# Patient Record
Sex: FEMALE | NOT HISPANIC OR LATINO | RURAL
[De-identification: names, ages, dates, MRNs, and addresses within clinical notes are randomized per-mention and may not be internally consistent; named-entity substitution may affect disease eponyms.]

---

## 2017-08-16 ENCOUNTER — OFFICE (OUTPATIENT)
Dept: RURAL CLINIC 3 | Facility: CLINIC | Age: 62
End: 2017-08-16

## 2017-08-16 VITALS
HEIGHT: 66 IN | DIASTOLIC BLOOD PRESSURE: 62 MMHG | HEART RATE: 88 BPM | SYSTOLIC BLOOD PRESSURE: 123 MMHG | WEIGHT: 254 LBS

## 2017-08-16 DIAGNOSIS — K59.1 FUNCTIONAL DIARRHEA: ICD-10-CM

## 2017-08-16 DIAGNOSIS — K21.9 GASTRO-ESOPHAGEAL REFLUX DISEASE WITHOUT ESOPHAGITIS: ICD-10-CM

## 2017-08-16 PROCEDURE — 99203 OFFICE O/P NEW LOW 30 MIN: CPT | Performed by: INTERNAL MEDICINE

## 2017-08-16 RX ORDER — RANITIDINE 300 MG/1
300 TABLET ORAL
Qty: 30 | Refills: 11 | Status: ACTIVE
Start: 2017-08-16

## 2017-10-18 ENCOUNTER — ON CAMPUS - OUTPATIENT (OUTPATIENT)
Dept: RURAL HOSPITAL 3 | Facility: HOSPITAL | Age: 62
End: 2017-10-18
Payer: COMMERCIAL

## 2017-10-18 DIAGNOSIS — R19.7 DIARRHEA, UNSPECIFIED: ICD-10-CM

## 2017-10-18 DIAGNOSIS — K57.90 DIVERTICULOSIS OF INTESTINE, PART UNSPECIFIED, WITHOUT PERFO: ICD-10-CM

## 2017-10-18 PROCEDURE — 45378 DIAGNOSTIC COLONOSCOPY: CPT | Performed by: INTERNAL MEDICINE

## 2017-11-20 ENCOUNTER — HOSPITAL ENCOUNTER (OUTPATIENT)
Dept: PHYSICAL THERAPY | Facility: HOSPITAL | Age: 62
Setting detail: RECURRING SERIES
Discharge: HOME | End: 2018-01-27
Attending: FAMILY MEDICINE | Admitting: FAMILY MEDICINE

## 2018-05-23 ENCOUNTER — HOSPITAL ENCOUNTER (OUTPATIENT)
Dept: PHYSICAL THERAPY | Facility: HOSPITAL | Age: 63
Setting detail: RECURRING SERIES
Discharge: HOME | End: 2018-07-23
Attending: FAMILY MEDICINE | Admitting: FAMILY MEDICINE

## 2020-03-12 ENCOUNTER — TRANSCRIBE ORDERS (OUTPATIENT)
Dept: ADMINISTRATIVE | Facility: HOSPITAL | Age: 65
End: 2020-03-12

## 2020-03-12 DIAGNOSIS — Z00.00 WELLNESS EXAMINATION: ICD-10-CM

## 2020-03-12 DIAGNOSIS — Z13.6 ENCOUNTER FOR SCREENING FOR VASCULAR DISEASE: Primary | ICD-10-CM

## 2020-07-08 ENCOUNTER — HOSPITAL ENCOUNTER (OUTPATIENT)
Dept: CARDIOLOGY | Facility: HOSPITAL | Age: 65
Discharge: HOME | End: 2020-07-08
Admitting: FAMILY MEDICINE

## 2020-07-08 ENCOUNTER — HOSPITAL ENCOUNTER (OUTPATIENT)
Dept: CT IMAGING | Facility: HOSPITAL | Age: 65
Discharge: HOME | End: 2020-07-08

## 2020-07-08 DIAGNOSIS — Z13.6 ENCOUNTER FOR SCREENING FOR VASCULAR DISEASE: ICD-10-CM

## 2020-07-08 DIAGNOSIS — Z00.00 WELLNESS EXAMINATION: ICD-10-CM

## 2020-07-08 LAB
BH CV XLRA MEAS - PAD LEFT ABI PT: 1.2
BH CV XLRA MEAS - PAD LEFT ARM: 127 MMHG
BH CV XLRA MEAS - PAD LEFT LEG PT: 152 MMHG
BH CV XLRA MEAS - PAD RIGHT ABI PT: 1.17
BH CV XLRA MEAS - PAD RIGHT ARM: 125 MMHG
BH CV XLRA MEAS - PAD RIGHT LEG PT: 148 MMHG
BH CV XLRA MEAS - PROX AO DIAM: 1.43 CM
BH CV XLRA MEAS LEFT CCA RATIO VEL: 80.1 CM/SEC
BH CV XLRA MEAS LEFT ICA RATIO VEL: -76.7 CM/SEC
BH CV XLRA MEAS LEFT ICA/CCA RATIO: -0.96
BH CV XLRA MEAS RIGHT CCA RATIO VEL: 87 CM/SEC
BH CV XLRA MEAS RIGHT ICA RATIO VEL: -82.1 CM/SEC
BH CV XLRA MEAS RIGHT ICA/CCA RATIO: -0.94

## 2020-07-08 PROCEDURE — 93799 UNLISTED CV SVC/PROCEDURE: CPT

## 2020-07-08 PROCEDURE — 75571 CT HRT W/O DYE W/CA TEST: CPT

## 2021-06-21 ENCOUNTER — HOSPITAL ENCOUNTER (OUTPATIENT)
Dept: GENERAL RADIOLOGY | Facility: HOSPITAL | Age: 66
Discharge: HOME | End: 2021-06-21

## 2021-06-21 ENCOUNTER — LAB (OUTPATIENT)
Dept: LAB | Facility: HOSPITAL | Age: 66
End: 2021-06-21

## 2021-06-21 ENCOUNTER — HOSPITAL ENCOUNTER (OUTPATIENT)
Dept: CARDIOLOGY | Facility: HOSPITAL | Age: 66
Discharge: HOME | End: 2021-06-21

## 2021-06-21 LAB
ABO GROUP BLD: NORMAL
ANION GAP SERPL CALCULATED.3IONS-SCNC: 7.4 MMOL/L (ref 5–15)
APTT PPP: 27.6 SECONDS (ref 24–31)
BACTERIA UR QL AUTO: NORMAL /HPF
BILIRUB UR QL STRIP: NEGATIVE
BLD GP AB SCN SERPL QL: NEGATIVE
BUN SERPL-MCNC: 16 MG/DL (ref 8–23)
BUN/CREAT SERPL: 66.7 (ref 7–25)
CALCIUM SPEC-SCNC: 9 MG/DL (ref 8.6–10.5)
CHLORIDE SERPL-SCNC: 105 MMOL/L (ref 98–107)
CLARITY UR: CLEAR
CO2 SERPL-SCNC: 26.6 MMOL/L (ref 22–29)
COLOR UR: YELLOW
CREAT SERPL-MCNC: 0.24 MG/DL (ref 0.57–1)
DEPRECATED RDW RBC AUTO: 41.5 FL (ref 37–54)
ERYTHROCYTE [DISTWIDTH] IN BLOOD BY AUTOMATED COUNT: 12.6 % (ref 12.3–15.4)
GFR SERPL CREATININE-BSD FRML MDRD: >150 ML/MIN/1.73
GLUCOSE SERPL-MCNC: 78 MG/DL (ref 65–99)
GLUCOSE UR STRIP-MCNC: NEGATIVE MG/DL
HCT VFR BLD AUTO: 41.5 % (ref 34–46.6)
HGB BLD-MCNC: 14.1 G/DL (ref 12–15.9)
HGB UR QL STRIP.AUTO: NEGATIVE
HYALINE CASTS UR QL AUTO: NORMAL /LPF
INR PPP: 0.98 (ref 0.93–1.1)
KETONES UR QL STRIP: NEGATIVE
LEUKOCYTE ESTERASE UR QL STRIP.AUTO: ABNORMAL
MCH RBC QN AUTO: 30.4 PG (ref 26.6–33)
MCHC RBC AUTO-ENTMCNC: 34 G/DL (ref 31.5–35.7)
MCV RBC AUTO: 89.4 FL (ref 79–97)
MRSA DNA SPEC QL NAA+PROBE: NORMAL
NITRITE UR QL STRIP: NEGATIVE
PH UR STRIP.AUTO: 6.5 [PH] (ref 5–8)
PLATELET # BLD AUTO: 251 10*3/MM3 (ref 140–450)
PMV BLD AUTO: 10.2 FL (ref 6–12)
POTASSIUM SERPL-SCNC: 4 MMOL/L (ref 3.5–5.2)
PROT UR QL STRIP: NEGATIVE
PROTHROMBIN TIME: 10.9 SECONDS (ref 9.6–11.7)
RBC # BLD AUTO: 4.64 10*6/MM3 (ref 3.77–5.28)
RBC # UR: NORMAL /HPF
REF LAB TEST METHOD: NORMAL
RH BLD: POSITIVE
SODIUM SERPL-SCNC: 139 MMOL/L (ref 136–145)
SP GR UR STRIP: 1.01 (ref 1–1.03)
SQUAMOUS #/AREA URNS HPF: NORMAL /HPF
T&S EXPIRATION DATE: NORMAL
UROBILINOGEN UR QL STRIP: ABNORMAL
WBC # BLD AUTO: 6.24 10*3/MM3 (ref 3.4–10.8)
WBC UR QL AUTO: NORMAL /HPF

## 2021-06-21 PROCEDURE — 93010 ELECTROCARDIOGRAM REPORT: CPT | Performed by: INTERNAL MEDICINE

## 2021-06-21 PROCEDURE — 86900 BLOOD TYPING SEROLOGIC ABO: CPT

## 2021-06-21 PROCEDURE — 86850 RBC ANTIBODY SCREEN: CPT

## 2021-06-21 PROCEDURE — 86901 BLOOD TYPING SEROLOGIC RH(D): CPT

## 2021-06-21 PROCEDURE — 93005 ELECTROCARDIOGRAM TRACING: CPT | Performed by: ORTHOPAEDIC SURGERY

## 2021-06-21 PROCEDURE — 71046 X-RAY EXAM CHEST 2 VIEWS: CPT

## 2021-06-21 PROCEDURE — 85027 COMPLETE CBC AUTOMATED: CPT

## 2021-06-21 PROCEDURE — 85730 THROMBOPLASTIN TIME PARTIAL: CPT

## 2021-06-21 PROCEDURE — 85610 PROTHROMBIN TIME: CPT

## 2021-06-21 PROCEDURE — 80048 BASIC METABOLIC PNL TOTAL CA: CPT

## 2021-06-21 PROCEDURE — 87641 MR-STAPH DNA AMP PROBE: CPT

## 2021-06-21 PROCEDURE — 81001 URINALYSIS AUTO W/SCOPE: CPT

## 2021-06-23 ENCOUNTER — LAB (OUTPATIENT)
Dept: LAB | Facility: HOSPITAL | Age: 66
End: 2021-06-23

## 2021-06-23 LAB — SARS-COV-2 ORF1AB RESP QL NAA+PROBE: NOT DETECTED

## 2021-06-23 PROCEDURE — U0004 COV-19 TEST NON-CDC HGH THRU: HCPCS

## 2021-06-23 PROCEDURE — C9803 HOPD COVID-19 SPEC COLLECT: HCPCS

## 2021-06-25 LAB — QT INTERVAL: 422 MS

## 2021-12-02 ASSESSMENT — HOOS JR
HOOS JR SCORE: 52.965
HOOS JR SCORE: 12

## 2021-12-06 ENCOUNTER — LAB (OUTPATIENT)
Dept: LAB | Facility: HOSPITAL | Age: 66
End: 2021-12-06

## 2021-12-06 ENCOUNTER — HOSPITAL ENCOUNTER (OUTPATIENT)
Dept: GENERAL RADIOLOGY | Facility: HOSPITAL | Age: 66
Discharge: HOME | End: 2021-12-06

## 2021-12-06 ENCOUNTER — HOSPITAL ENCOUNTER (OUTPATIENT)
Dept: CARDIOLOGY | Facility: HOSPITAL | Age: 66
Discharge: HOME | End: 2021-12-06

## 2021-12-06 LAB
ANION GAP SERPL CALCULATED.3IONS-SCNC: 6.2 MMOL/L (ref 5–15)
APTT PPP: 28.4 SECONDS (ref 24–31)
BACTERIA UR QL AUTO: ABNORMAL /HPF
BASOPHILS # BLD AUTO: 0.04 10*3/MM3 (ref 0–0.2)
BASOPHILS NFR BLD AUTO: 0.6 % (ref 0–1.5)
BILIRUB UR QL STRIP: NEGATIVE
BUN SERPL-MCNC: 17 MG/DL (ref 8–23)
BUN/CREAT SERPL: 25.8 (ref 7–25)
CALCIUM SPEC-SCNC: 9.1 MG/DL (ref 8.6–10.5)
CHLORIDE SERPL-SCNC: 102 MMOL/L (ref 98–107)
CLARITY UR: CLEAR
CO2 SERPL-SCNC: 28.8 MMOL/L (ref 22–29)
COLOR UR: YELLOW
CREAT SERPL-MCNC: 0.66 MG/DL (ref 0.57–1)
DEPRECATED RDW RBC AUTO: 43.3 FL (ref 37–54)
EOSINOPHIL # BLD AUTO: 0.22 10*3/MM3 (ref 0–0.4)
EOSINOPHIL NFR BLD AUTO: 3.1 % (ref 0.3–6.2)
ERYTHROCYTE [DISTWIDTH] IN BLOOD BY AUTOMATED COUNT: 12.9 % (ref 12.3–15.4)
GFR SERPL CREATININE-BSD FRML MDRD: 90 ML/MIN/1.73
GLUCOSE SERPL-MCNC: 79 MG/DL (ref 65–99)
GLUCOSE UR STRIP-MCNC: NEGATIVE MG/DL
HCT VFR BLD AUTO: 40.7 % (ref 34–46.6)
HGB BLD-MCNC: 13.1 G/DL (ref 12–15.9)
HGB UR QL STRIP.AUTO: NEGATIVE
HYALINE CASTS UR QL AUTO: ABNORMAL /LPF
IMM GRANULOCYTES # BLD AUTO: 0.01 10*3/MM3 (ref 0–0.05)
IMM GRANULOCYTES NFR BLD AUTO: 0.1 % (ref 0–0.5)
INR PPP: 0.96 (ref 0.93–1.1)
KETONES UR QL STRIP: NEGATIVE
LEUKOCYTE ESTERASE UR QL STRIP.AUTO: ABNORMAL
LYMPHOCYTES # BLD AUTO: 2.64 10*3/MM3 (ref 0.7–3.1)
LYMPHOCYTES NFR BLD AUTO: 37.3 % (ref 19.6–45.3)
MCH RBC QN AUTO: 29.5 PG (ref 26.6–33)
MCHC RBC AUTO-ENTMCNC: 32.2 G/DL (ref 31.5–35.7)
MCV RBC AUTO: 91.7 FL (ref 79–97)
MONOCYTES # BLD AUTO: 0.58 10*3/MM3 (ref 0.1–0.9)
MONOCYTES NFR BLD AUTO: 8.2 % (ref 5–12)
MRSA DNA SPEC QL NAA+PROBE: NORMAL
NEUTROPHILS NFR BLD AUTO: 3.58 10*3/MM3 (ref 1.7–7)
NEUTROPHILS NFR BLD AUTO: 50.7 % (ref 42.7–76)
NITRITE UR QL STRIP: NEGATIVE
NRBC BLD AUTO-RTO: 0.1 /100 WBC (ref 0–0.2)
PH UR STRIP.AUTO: 6.5 [PH] (ref 5–8)
PLATELET # BLD AUTO: 238 10*3/MM3 (ref 140–450)
PMV BLD AUTO: 10.5 FL (ref 6–12)
POTASSIUM SERPL-SCNC: 3.9 MMOL/L (ref 3.5–5.2)
PROT UR QL STRIP: NEGATIVE
PROTHROMBIN TIME: 10.7 SECONDS (ref 9.6–11.7)
QT INTERVAL: 440 MS
RBC # BLD AUTO: 4.44 10*6/MM3 (ref 3.77–5.28)
RBC # UR STRIP: ABNORMAL /HPF
REF LAB TEST METHOD: ABNORMAL
SODIUM SERPL-SCNC: 137 MMOL/L (ref 136–145)
SP GR UR STRIP: 1.01 (ref 1–1.03)
SQUAMOUS #/AREA URNS HPF: ABNORMAL /HPF
UROBILINOGEN UR QL STRIP: ABNORMAL
WBC # UR STRIP: ABNORMAL /HPF
WBC NRBC COR # BLD: 7.07 10*3/MM3 (ref 3.4–10.8)

## 2021-12-06 PROCEDURE — 81001 URINALYSIS AUTO W/SCOPE: CPT

## 2021-12-06 PROCEDURE — 85730 THROMBOPLASTIN TIME PARTIAL: CPT

## 2021-12-06 PROCEDURE — 85610 PROTHROMBIN TIME: CPT

## 2021-12-06 PROCEDURE — 93010 ELECTROCARDIOGRAM REPORT: CPT | Performed by: INTERNAL MEDICINE

## 2021-12-06 PROCEDURE — 85025 COMPLETE CBC W/AUTO DIFF WBC: CPT

## 2021-12-06 PROCEDURE — 87641 MR-STAPH DNA AMP PROBE: CPT

## 2021-12-06 PROCEDURE — 93005 ELECTROCARDIOGRAM TRACING: CPT | Performed by: ORTHOPAEDIC SURGERY

## 2021-12-06 PROCEDURE — 80048 BASIC METABOLIC PNL TOTAL CA: CPT

## 2021-12-06 PROCEDURE — 71046 X-RAY EXAM CHEST 2 VIEWS: CPT

## 2021-12-08 ENCOUNTER — LAB (OUTPATIENT)
Dept: LAB | Facility: HOSPITAL | Age: 66
End: 2021-12-08

## 2021-12-08 LAB — SARS-COV-2 ORF1AB RESP QL NAA+PROBE: NOT DETECTED

## 2021-12-08 PROCEDURE — C9803 HOPD COVID-19 SPEC COLLECT: HCPCS

## 2021-12-08 PROCEDURE — U0004 COV-19 TEST NON-CDC HGH THRU: HCPCS

## 2021-12-10 ENCOUNTER — APPOINTMENT (OUTPATIENT)
Dept: GENERAL RADIOLOGY | Facility: HOSPITAL | Age: 66
End: 2021-12-10

## 2021-12-10 ENCOUNTER — HOSPITAL ENCOUNTER (OUTPATIENT)
Facility: HOSPITAL | Age: 66
Setting detail: OBSERVATION
Discharge: HOME | End: 2021-12-11
Attending: ORTHOPAEDIC SURGERY | Admitting: ORTHOPAEDIC SURGERY

## 2021-12-10 ENCOUNTER — ANESTHESIA EVENT (OUTPATIENT)
Dept: PERIOP | Facility: HOSPITAL | Age: 66
End: 2021-12-10

## 2021-12-10 ENCOUNTER — ANESTHESIA (OUTPATIENT)
Dept: PERIOP | Facility: HOSPITAL | Age: 66
End: 2021-12-10

## 2021-12-10 DIAGNOSIS — Z96.642 STATUS POST TOTAL REPLACEMENT OF LEFT HIP: Primary | ICD-10-CM

## 2021-12-10 PROBLEM — Z96.649 STATUS POST TOTAL REPLACEMENT OF HIP: Status: ACTIVE | Noted: 2021-12-10

## 2021-12-10 LAB
ABO GROUP BLD: NORMAL
BLD GP AB SCN SERPL QL: NEGATIVE
HCT VFR BLD AUTO: 39.6 % (ref 34–46.6)
HGB BLD-MCNC: 13.4 G/DL (ref 12–15.9)
RH BLD: POSITIVE
T&S EXPIRATION DATE: NORMAL

## 2021-12-10 PROCEDURE — 25010000002 ONDANSETRON PER 1 MG: Performed by: NURSE ANESTHETIST, CERTIFIED REGISTERED

## 2021-12-10 PROCEDURE — 25010000002 CEFAZOLIN PER 500 MG: Performed by: ORTHOPAEDIC SURGERY

## 2021-12-10 PROCEDURE — G0378 HOSPITAL OBSERVATION PER HR: HCPCS

## 2021-12-10 PROCEDURE — 86850 RBC ANTIBODY SCREEN: CPT | Performed by: ORTHOPAEDIC SURGERY

## 2021-12-10 PROCEDURE — C1889 IMPLANT/INSERT DEVICE, NOC: HCPCS | Performed by: ORTHOPAEDIC SURGERY

## 2021-12-10 PROCEDURE — 72170 X-RAY EXAM OF PELVIS: CPT

## 2021-12-10 PROCEDURE — 73501 X-RAY EXAM HIP UNI 1 VIEW: CPT

## 2021-12-10 PROCEDURE — 86901 BLOOD TYPING SEROLOGIC RH(D): CPT | Performed by: ORTHOPAEDIC SURGERY

## 2021-12-10 PROCEDURE — 25010000002 PROPOFOL 10 MG/ML EMULSION: Performed by: NURSE ANESTHETIST, CERTIFIED REGISTERED

## 2021-12-10 PROCEDURE — 25010000002 DEXAMETHASONE PER 1 MG: Performed by: NURSE ANESTHETIST, CERTIFIED REGISTERED

## 2021-12-10 PROCEDURE — 25010000002 PROPOFOL 200 MG/20ML EMULSION: Performed by: NURSE ANESTHETIST, CERTIFIED REGISTERED

## 2021-12-10 PROCEDURE — C1776 JOINT DEVICE (IMPLANTABLE): HCPCS | Performed by: ORTHOPAEDIC SURGERY

## 2021-12-10 PROCEDURE — 25010000002 MAGNESIUM SULFATE PER 500 MG OF MAGNESIUM: Performed by: NURSE ANESTHETIST, CERTIFIED REGISTERED

## 2021-12-10 PROCEDURE — 25010000002 FENTANYL CITRATE (PF) 100 MCG/2ML SOLUTION: Performed by: NURSE ANESTHETIST, CERTIFIED REGISTERED

## 2021-12-10 PROCEDURE — 85014 HEMATOCRIT: CPT | Performed by: ORTHOPAEDIC SURGERY

## 2021-12-10 PROCEDURE — 25010000002 HYDROMORPHONE PER 4 MG: Performed by: NURSE ANESTHETIST, CERTIFIED REGISTERED

## 2021-12-10 PROCEDURE — 25010000002 MIDAZOLAM PER 1 MG: Performed by: NURSE ANESTHETIST, CERTIFIED REGISTERED

## 2021-12-10 PROCEDURE — 76000 FLUOROSCOPY <1 HR PHYS/QHP: CPT

## 2021-12-10 PROCEDURE — 25010000002 DROPERIDOL PER 5 MG: Performed by: NURSE ANESTHETIST, CERTIFIED REGISTERED

## 2021-12-10 PROCEDURE — 85018 HEMOGLOBIN: CPT | Performed by: ORTHOPAEDIC SURGERY

## 2021-12-10 PROCEDURE — 86900 BLOOD TYPING SEROLOGIC ABO: CPT | Performed by: ORTHOPAEDIC SURGERY

## 2021-12-10 PROCEDURE — 25010000002 HYDROMORPHONE PER 4 MG: Performed by: ORTHOPAEDIC SURGERY

## 2021-12-10 DEVICE — OR3O DUAL MOBILITY XLPE INSERT 22/40
Type: IMPLANTABLE DEVICE | Site: HIP | Status: FUNCTIONAL
Brand: OR3O DUAL MOBILITY

## 2021-12-10 DEVICE — OR3O DUAL MOBILITY LINER 40/52
Type: IMPLANTABLE DEVICE | Site: HIP | Status: FUNCTIONAL
Brand: OR3O DUAL MOBILITY

## 2021-12-10 DEVICE — IMPLANTABLE DEVICE: Type: IMPLANTABLE DEVICE | Site: HIP | Status: FUNCTIONAL

## 2021-12-10 DEVICE — POLARSTEM STANDARD NON-CEMENTED                                    WITH TI/HA 2
Type: IMPLANTABLE DEVICE | Site: HIP | Status: FUNCTIONAL
Brand: POLARSTEM

## 2021-12-10 DEVICE — OXINIUM FEMORAL HEAD 12/14 TAPER                                    22 MM +4
Type: IMPLANTABLE DEVICE | Site: HIP | Status: FUNCTIONAL
Brand: OXINIUM

## 2021-12-10 DEVICE — R3 3 HOLE ACETABULAR SHELL 52MM
Type: IMPLANTABLE DEVICE | Site: HIP | Status: FUNCTIONAL
Brand: R3 ACETABULAR

## 2021-12-10 DEVICE — DEV CONTRL TISS STRATAFIX SPIRAL PDS PLS CT1 2-0 1/2 30CM: Type: IMPLANTABLE DEVICE | Site: HIP | Status: FUNCTIONAL

## 2021-12-10 RX ORDER — PROPOFOL 10 MG/ML
INJECTION, EMULSION INTRAVENOUS AS NEEDED
Status: DISCONTINUED | OUTPATIENT
Start: 2021-12-10 | End: 2021-12-10 | Stop reason: SURG

## 2021-12-10 RX ORDER — ONDANSETRON 4 MG/1
4 TABLET, FILM COATED ORAL EVERY 6 HOURS PRN
Status: DISCONTINUED | OUTPATIENT
Start: 2021-12-10 | End: 2021-12-11 | Stop reason: HOSPADM

## 2021-12-10 RX ORDER — ONDANSETRON 2 MG/ML
INJECTION INTRAMUSCULAR; INTRAVENOUS AS NEEDED
Status: DISCONTINUED | OUTPATIENT
Start: 2021-12-10 | End: 2021-12-10 | Stop reason: SURG

## 2021-12-10 RX ORDER — OXYCODONE HYDROCHLORIDE 5 MG/1
10 TABLET ORAL EVERY 4 HOURS PRN
Status: DISCONTINUED | OUTPATIENT
Start: 2021-12-10 | End: 2021-12-11 | Stop reason: HOSPADM

## 2021-12-10 RX ORDER — MAGNESIUM SULFATE HEPTAHYDRATE 500 MG/ML
INJECTION, SOLUTION INTRAMUSCULAR; INTRAVENOUS AS NEEDED
Status: DISCONTINUED | OUTPATIENT
Start: 2021-12-10 | End: 2021-12-10 | Stop reason: SURG

## 2021-12-10 RX ORDER — LIDOCAINE HYDROCHLORIDE 20 MG/ML
INJECTION, SOLUTION EPIDURAL; INFILTRATION; INTRACAUDAL; PERINEURAL AS NEEDED
Status: DISCONTINUED | OUTPATIENT
Start: 2021-12-10 | End: 2021-12-10 | Stop reason: SURG

## 2021-12-10 RX ORDER — OXYCODONE HYDROCHLORIDE 5 MG/1
5 TABLET ORAL EVERY 4 HOURS PRN
Status: DISCONTINUED | OUTPATIENT
Start: 2021-12-10 | End: 2021-12-11 | Stop reason: HOSPADM

## 2021-12-10 RX ORDER — DROPERIDOL 2.5 MG/ML
1.25 INJECTION, SOLUTION INTRAMUSCULAR; INTRAVENOUS ONCE AS NEEDED
Status: COMPLETED | OUTPATIENT
Start: 2021-12-10 | End: 2021-12-10

## 2021-12-10 RX ORDER — HYDROMORPHONE HCL 110MG/55ML
0.5 PATIENT CONTROLLED ANALGESIA SYRINGE INTRAVENOUS
Status: DISCONTINUED | OUTPATIENT
Start: 2021-12-10 | End: 2021-12-10 | Stop reason: HOSPADM

## 2021-12-10 RX ORDER — EPHEDRINE SULFATE 5 MG/ML
5 INJECTION INTRAVENOUS ONCE AS NEEDED
Status: DISCONTINUED | OUTPATIENT
Start: 2021-12-10 | End: 2021-12-10 | Stop reason: HOSPADM

## 2021-12-10 RX ORDER — MIDAZOLAM HYDROCHLORIDE 1 MG/ML
INJECTION INTRAMUSCULAR; INTRAVENOUS AS NEEDED
Status: DISCONTINUED | OUTPATIENT
Start: 2021-12-10 | End: 2021-12-10 | Stop reason: SURG

## 2021-12-10 RX ORDER — BUPIVACAINE HYDROCHLORIDE 2.5 MG/ML
INJECTION, SOLUTION EPIDURAL; INFILTRATION; INTRACAUDAL AS NEEDED
Status: DISCONTINUED | OUTPATIENT
Start: 2021-12-10 | End: 2021-12-10 | Stop reason: HOSPADM

## 2021-12-10 RX ORDER — ASPIRIN 81 MG/1
81 TABLET ORAL EVERY 12 HOURS SCHEDULED
Status: DISCONTINUED | OUTPATIENT
Start: 2021-12-11 | End: 2021-12-11 | Stop reason: HOSPADM

## 2021-12-10 RX ORDER — FENTANYL CITRATE 50 UG/ML
50 INJECTION, SOLUTION INTRAMUSCULAR; INTRAVENOUS
Status: DISCONTINUED | OUTPATIENT
Start: 2021-12-10 | End: 2021-12-10 | Stop reason: HOSPADM

## 2021-12-10 RX ORDER — HYDROMORPHONE HCL 110MG/55ML
1 PATIENT CONTROLLED ANALGESIA SYRINGE INTRAVENOUS EVERY 4 HOURS PRN
Status: DISCONTINUED | OUTPATIENT
Start: 2021-12-10 | End: 2021-12-11 | Stop reason: HOSPADM

## 2021-12-10 RX ORDER — IRBESARTAN 75 MG/1
TABLET ORAL
Status: ON HOLD | COMMUNITY
End: 2021-12-10 | Stop reason: SDUPTHER

## 2021-12-10 RX ORDER — SODIUM CHLORIDE, SODIUM LACTATE, POTASSIUM CHLORIDE, CALCIUM CHLORIDE 600; 310; 30; 20 MG/100ML; MG/100ML; MG/100ML; MG/100ML
100 INJECTION, SOLUTION INTRAVENOUS CONTINUOUS
Status: DISCONTINUED | OUTPATIENT
Start: 2021-12-10 | End: 2021-12-11 | Stop reason: HOSPADM

## 2021-12-10 RX ORDER — SODIUM CHLORIDE 0.9 % (FLUSH) 0.9 %
10 SYRINGE (ML) INJECTION AS NEEDED
Status: DISCONTINUED | OUTPATIENT
Start: 2021-12-10 | End: 2021-12-10 | Stop reason: HOSPADM

## 2021-12-10 RX ORDER — ONDANSETRON 2 MG/ML
4 INJECTION INTRAMUSCULAR; INTRAVENOUS ONCE AS NEEDED
Status: COMPLETED | OUTPATIENT
Start: 2021-12-10 | End: 2021-12-10

## 2021-12-10 RX ORDER — KETAMINE HCL IN NACL, ISO-OSM 100MG/10ML
SYRINGE (ML) INJECTION AS NEEDED
Status: DISCONTINUED | OUTPATIENT
Start: 2021-12-10 | End: 2021-12-10 | Stop reason: SURG

## 2021-12-10 RX ORDER — FLUMAZENIL 0.1 MG/ML
0.1 INJECTION INTRAVENOUS AS NEEDED
Status: DISCONTINUED | OUTPATIENT
Start: 2021-12-10 | End: 2021-12-10 | Stop reason: HOSPADM

## 2021-12-10 RX ORDER — LORAZEPAM 2 MG/ML
1 INJECTION INTRAMUSCULAR
Status: DISCONTINUED | OUTPATIENT
Start: 2021-12-10 | End: 2021-12-10 | Stop reason: HOSPADM

## 2021-12-10 RX ORDER — DOCUSATE SODIUM 100 MG/1
100 CAPSULE, LIQUID FILLED ORAL 2 TIMES DAILY PRN
Status: DISCONTINUED | OUTPATIENT
Start: 2021-12-10 | End: 2021-12-11 | Stop reason: HOSPADM

## 2021-12-10 RX ORDER — NALOXONE HCL 0.4 MG/ML
0.4 VIAL (ML) INJECTION AS NEEDED
Status: DISCONTINUED | OUTPATIENT
Start: 2021-12-10 | End: 2021-12-10 | Stop reason: HOSPADM

## 2021-12-10 RX ORDER — LABETALOL HYDROCHLORIDE 5 MG/ML
5 INJECTION, SOLUTION INTRAVENOUS
Status: DISCONTINUED | OUTPATIENT
Start: 2021-12-10 | End: 2021-12-10 | Stop reason: HOSPADM

## 2021-12-10 RX ORDER — NALOXONE HCL 0.4 MG/ML
0.1 VIAL (ML) INJECTION
Status: DISCONTINUED | OUTPATIENT
Start: 2021-12-10 | End: 2021-12-11 | Stop reason: HOSPADM

## 2021-12-10 RX ORDER — SODIUM CHLORIDE, SODIUM LACTATE, POTASSIUM CHLORIDE, CALCIUM CHLORIDE 600; 310; 30; 20 MG/100ML; MG/100ML; MG/100ML; MG/100ML
1000 INJECTION, SOLUTION INTRAVENOUS CONTINUOUS
Status: DISCONTINUED | OUTPATIENT
Start: 2021-12-10 | End: 2021-12-11 | Stop reason: HOSPADM

## 2021-12-10 RX ORDER — TRANEXAMIC ACID 10 MG/ML
1000 INJECTION, SOLUTION INTRAVENOUS ONCE
Status: COMPLETED | OUTPATIENT
Start: 2021-12-10 | End: 2021-12-10

## 2021-12-10 RX ORDER — DIPHENHYDRAMINE HYDROCHLORIDE 50 MG/ML
12.5 INJECTION INTRAMUSCULAR; INTRAVENOUS
Status: DISCONTINUED | OUTPATIENT
Start: 2021-12-10 | End: 2021-12-10 | Stop reason: HOSPADM

## 2021-12-10 RX ORDER — FUROSEMIDE 20 MG/1
20 TABLET ORAL DAILY
Status: DISCONTINUED | OUTPATIENT
Start: 2021-12-10 | End: 2021-12-11 | Stop reason: HOSPADM

## 2021-12-10 RX ORDER — FENTANYL CITRATE 50 UG/ML
INJECTION, SOLUTION INTRAMUSCULAR; INTRAVENOUS AS NEEDED
Status: DISCONTINUED | OUTPATIENT
Start: 2021-12-10 | End: 2021-12-10 | Stop reason: SURG

## 2021-12-10 RX ORDER — ESCITALOPRAM OXALATE 10 MG/1
10 TABLET ORAL EVERY MORNING
Status: DISCONTINUED | OUTPATIENT
Start: 2021-12-11 | End: 2021-12-11 | Stop reason: HOSPADM

## 2021-12-10 RX ORDER — DEXAMETHASONE SODIUM PHOSPHATE 4 MG/ML
INJECTION, SOLUTION INTRA-ARTICULAR; INTRALESIONAL; INTRAMUSCULAR; INTRAVENOUS; SOFT TISSUE AS NEEDED
Status: DISCONTINUED | OUTPATIENT
Start: 2021-12-10 | End: 2021-12-10 | Stop reason: SURG

## 2021-12-10 RX ORDER — IPRATROPIUM BROMIDE AND ALBUTEROL SULFATE 2.5; .5 MG/3ML; MG/3ML
3 SOLUTION RESPIRATORY (INHALATION) ONCE AS NEEDED
Status: DISCONTINUED | OUTPATIENT
Start: 2021-12-10 | End: 2021-12-10 | Stop reason: HOSPADM

## 2021-12-10 RX ORDER — SODIUM CHLORIDE, SODIUM LACTATE, POTASSIUM CHLORIDE, CALCIUM CHLORIDE 600; 310; 30; 20 MG/100ML; MG/100ML; MG/100ML; MG/100ML
1000 INJECTION, SOLUTION INTRAVENOUS CONTINUOUS
Status: DISCONTINUED | OUTPATIENT
Start: 2021-12-10 | End: 2021-12-10 | Stop reason: SDUPTHER

## 2021-12-10 RX ORDER — ROCURONIUM BROMIDE 10 MG/ML
INJECTION, SOLUTION INTRAVENOUS AS NEEDED
Status: DISCONTINUED | OUTPATIENT
Start: 2021-12-10 | End: 2021-12-10 | Stop reason: SURG

## 2021-12-10 RX ORDER — TRANEXAMIC ACID 10 MG/ML
1000 INJECTION, SOLUTION INTRAVENOUS ONCE
Status: DISCONTINUED | OUTPATIENT
Start: 2021-12-10 | End: 2021-12-10 | Stop reason: HOSPADM

## 2021-12-10 RX ORDER — MELOXICAM 15 MG/1
15 TABLET ORAL DAILY
Status: DISCONTINUED | OUTPATIENT
Start: 2021-12-10 | End: 2021-12-11 | Stop reason: HOSPADM

## 2021-12-10 RX ORDER — ONDANSETRON 2 MG/ML
4 INJECTION INTRAMUSCULAR; INTRAVENOUS EVERY 6 HOURS PRN
Status: DISCONTINUED | OUTPATIENT
Start: 2021-12-10 | End: 2021-12-11 | Stop reason: HOSPADM

## 2021-12-10 RX ORDER — SODIUM CHLORIDE 9 MG/ML
100 INJECTION, SOLUTION INTRAVENOUS CONTINUOUS
Status: DISCONTINUED | OUTPATIENT
Start: 2021-12-10 | End: 2021-12-11 | Stop reason: HOSPADM

## 2021-12-10 RX ORDER — LIDOCAINE HYDROCHLORIDE 10 MG/ML
INJECTION, SOLUTION EPIDURAL; INFILTRATION; INTRACAUDAL; PERINEURAL AS NEEDED
Status: DISCONTINUED | OUTPATIENT
Start: 2021-12-10 | End: 2021-12-10 | Stop reason: SURG

## 2021-12-10 RX ORDER — HYDRALAZINE HYDROCHLORIDE 20 MG/ML
5 INJECTION INTRAMUSCULAR; INTRAVENOUS
Status: DISCONTINUED | OUTPATIENT
Start: 2021-12-10 | End: 2021-12-10 | Stop reason: HOSPADM

## 2021-12-10 RX ORDER — LOSARTAN POTASSIUM 25 MG/1
25 TABLET ORAL
Status: DISCONTINUED | OUTPATIENT
Start: 2021-12-10 | End: 2021-12-11 | Stop reason: HOSPADM

## 2021-12-10 RX ADMIN — PROPOFOL 150 MG: 10 INJECTION, EMULSION INTRAVENOUS at 14:38

## 2021-12-10 RX ADMIN — HYDROMORPHONE HYDROCHLORIDE 0.5 MG: 2 INJECTION, SOLUTION INTRAMUSCULAR; INTRAVENOUS; SUBCUTANEOUS at 16:30

## 2021-12-10 RX ADMIN — LIDOCAINE HYDROCHLORIDE 200 MG: 20 INJECTION, SOLUTION EPIDURAL; INFILTRATION; INTRACAUDAL; PERINEURAL at 14:36

## 2021-12-10 RX ADMIN — HYDROMORPHONE HYDROCHLORIDE 1 MG: 2 INJECTION, SOLUTION INTRAMUSCULAR; INTRAVENOUS; SUBCUTANEOUS at 22:34

## 2021-12-10 RX ADMIN — FENTANYL CITRATE 25 MCG: 50 INJECTION, SOLUTION INTRAMUSCULAR; INTRAVENOUS at 14:46

## 2021-12-10 RX ADMIN — ONDANSETRON 4 MG: 2 INJECTION INTRAMUSCULAR; INTRAVENOUS at 16:45

## 2021-12-10 RX ADMIN — PROPOFOL 150 MCG/KG/MIN: 10 INJECTION, EMULSION INTRAVENOUS at 14:38

## 2021-12-10 RX ADMIN — SODIUM CHLORIDE, POTASSIUM CHLORIDE, SODIUM LACTATE AND CALCIUM CHLORIDE 1000 ML: 600; 310; 30; 20 INJECTION, SOLUTION INTRAVENOUS at 12:35

## 2021-12-10 RX ADMIN — SUGAMMADEX 200 MG: 100 INJECTION, SOLUTION INTRAVENOUS at 15:36

## 2021-12-10 RX ADMIN — LIDOCAINE HYDROCHLORIDE 50 MG: 10 INJECTION, SOLUTION EPIDURAL; INFILTRATION; INTRACAUDAL; PERINEURAL at 14:38

## 2021-12-10 RX ADMIN — DROPERIDOL 1.25 MG: 2.5 INJECTION, SOLUTION INTRAMUSCULAR; INTRAVENOUS at 17:15

## 2021-12-10 RX ADMIN — WATER 2 G: 1000 INJECTION, SOLUTION INTRAVENOUS at 20:36

## 2021-12-10 RX ADMIN — ROCURONIUM BROMIDE 40 MG: 10 SOLUTION INTRAVENOUS at 14:38

## 2021-12-10 RX ADMIN — MELOXICAM 15 MG: 15 TABLET ORAL at 19:19

## 2021-12-10 RX ADMIN — TRANEXAMIC ACID 1000 MG: 10 INJECTION, SOLUTION INTRAVENOUS at 14:43

## 2021-12-10 RX ADMIN — OXYCODONE 10 MG: 5 TABLET ORAL at 19:19

## 2021-12-10 RX ADMIN — DEXAMETHASONE SODIUM PHOSPHATE 4 MG: 4 INJECTION, SOLUTION INTRAMUSCULAR; INTRAVENOUS at 14:47

## 2021-12-10 RX ADMIN — OXYCODONE 10 MG: 5 TABLET ORAL at 23:18

## 2021-12-10 RX ADMIN — FENTANYL CITRATE 50 MCG: 50 INJECTION, SOLUTION INTRAMUSCULAR; INTRAVENOUS at 15:04

## 2021-12-10 RX ADMIN — ROCURONIUM BROMIDE 20 MG: 10 SOLUTION INTRAVENOUS at 15:18

## 2021-12-10 RX ADMIN — HYDROMORPHONE HYDROCHLORIDE 0.5 MG: 2 INJECTION, SOLUTION INTRAMUSCULAR; INTRAVENOUS; SUBCUTANEOUS at 16:05

## 2021-12-10 RX ADMIN — CEFAZOLIN SODIUM 2 G: 1 INJECTION, POWDER, FOR SOLUTION INTRAMUSCULAR; INTRAVENOUS at 14:44

## 2021-12-10 RX ADMIN — SODIUM CHLORIDE 100 ML/HR: 9 INJECTION, SOLUTION INTRAVENOUS at 20:36

## 2021-12-10 RX ADMIN — FENTANYL CITRATE 25 MCG: 50 INJECTION, SOLUTION INTRAMUSCULAR; INTRAVENOUS at 14:51

## 2021-12-10 RX ADMIN — FUROSEMIDE 20 MG: 20 TABLET ORAL at 20:34

## 2021-12-10 RX ADMIN — Medication 30 MG: at 14:36

## 2021-12-10 RX ADMIN — MIDAZOLAM 2 MG: 1 INJECTION INTRAMUSCULAR; INTRAVENOUS at 14:34

## 2021-12-10 RX ADMIN — LOSARTAN POTASSIUM 25 MG: 25 TABLET, FILM COATED ORAL at 20:34

## 2021-12-10 RX ADMIN — ONDANSETRON 4 MG: 2 INJECTION INTRAMUSCULAR; INTRAVENOUS at 15:26

## 2021-12-10 RX ADMIN — MAGNESIUM SULFATE HEPTAHYDRATE 1 G: 500 INJECTION, SOLUTION INTRAMUSCULAR; INTRAVENOUS at 14:36

## 2021-12-10 RX ADMIN — ONDANSETRON 4 MG: 2 INJECTION INTRAMUSCULAR; INTRAVENOUS at 15:28

## 2021-12-10 RX ADMIN — SODIUM CHLORIDE, POTASSIUM CHLORIDE, SODIUM LACTATE AND CALCIUM CHLORIDE: 600; 310; 30; 20 INJECTION, SOLUTION INTRAVENOUS at 15:31

## 2021-12-10 NOTE — SIGNIFICANT NOTE
12/10/21 1531   OTHER   Discipline physical therapist   Rehab Time/Intention   Session Not Performed patient unavailable for evaluation  (pt still in sx at 15:32. Will eval in AM.)   Recommendation   PT - Next Appointment 12/11/21

## 2021-12-10 NOTE — ANESTHESIA PROCEDURE NOTES
Airway  Urgency: elective    Date/Time: 12/10/2021 2:42 PM  Airway not difficult    General Information and Staff    Patient location during procedure: OR  Anesthesiologist: Justin Celaya MD  CRNA: Alley Aguirre CRNA    Indications and Patient Condition  Indications for airway management: airway protection    Preoxygenated: yes  MILS maintained throughout  Mask difficulty assessment: 2 - vent by mask + OA or adjuvant +/- NMBA    Final Airway Details  Final airway type: endotracheal airway      Successful airway: ETT  Cuffed: yes   Successful intubation technique: direct laryngoscopy  Facilitating devices/methods: intubating stylet  Endotracheal tube insertion site: oral  Blade: Christin  Blade size: 3  ETT size (mm): 7.0  Cormack-Lehane Classification: grade I - full view of glottis  Placement verified by: chest auscultation and capnometry   Measured from: lips  ETT/EBT  to lips (cm): 22  Number of attempts at approach: 1  Assessment: lips, teeth, and gum same as pre-op and atraumatic intubation

## 2021-12-10 NOTE — H&P
Orthopaedic Surgery  History & Physical For Elective Total Hip  Dr. CHELSEA Hernandez II  [phone]    HPI:  Patient is a 66 y.o. Not  or  female who presents with End-stage arthritis of the left hip.  They failed conservative treatment of their hip pain and a thorough discussion of the risks and benefits of surgery was had.  The patient wishes to continue with elective total hip replacement, they were scheduled and are here for surgery. They did get medical clearance as well as a thorough preoperative workup.     MEDICAL HISTORY  Past Medical History:   Diagnosis Date   • Anxiety    • Bilateral leg edema    • COVID-19 11/2020   • Crohn's disease (HCC)     not active   • Hypertension    • Osteoarthritis    • PONV (postoperative nausea and vomiting)    • RLS (restless legs syndrome)    ·   Past Surgical History:   Procedure Laterality Date   • LAPAROSCOPIC CHOLECYSTECTOMY  2014   ·   Prior to Admission medications    Medication Sig Start Date End Date Taking? Authorizing Provider   Bacillus Coagulans-Inulin (PROBIOTIC FORMULA PO) Take  by mouth.   Yes Dalton Cuello MD   escitalopram (LEXAPRO) 10 MG tablet Take 10 mg by mouth Every Morning. Take dos   Yes Dalton Cuello MD   furosemide (LASIX) 20 MG tablet Take 20 mg by mouth Daily. Do not take dos   Yes Dalton Cuello MD   irbesartan (AVAPRO) 75 MG tablet Take 75 mg by mouth Every Morning. HOLD 24 hours before surgery   Yes Dalton Cuello MD   potassium chloride 10 MEQ CR tablet Take 10 mEq by mouth Daily. Take dos   Yes Dalton Cuello MD   pramipexole (MIRAPEX) 0.25 MG tablet Take 0.25 mg by mouth 3 (Three) Times a Day.   Yes Dalton Cuello MD   ibuprofen (ADVIL,MOTRIN) 800 MG tablet Take 800 mg by mouth Every 6 (Six) Hours As Needed for Mild Pain . Stop 6-18 for surgery    Dalton Cuello MD   irbesartan (AVAPRO) 75 MG tablet irbesartan 75 mg tablet  12/10/21  Dalton Cuello MD   ·   · No  "Known Allergies  ·   · There is no immunization history on file for this patient.  Social History     Tobacco Use   • Smoking status: Never Smoker   • Smokeless tobacco: Not on file   Substance Use Topics   • Alcohol use: Not Currently   ·    Social History     Substance and Sexual Activity   Drug Use Not Currently   ·     REVIEW OF SYSTEMS:  · Head: negative for headache  · Respiratory: negative for shortness of breath.   · Cardiovascular: negative for chest pain.   · Gastrointestinal: negative abdominal pain.   · Neurological: negative for LOC  · Psychiatric/Behavioral: negative for memory loss.   · All other systems reviewed and are negative    VITALS: /77 (BP Location: Left arm, Patient Position: Sitting)   Pulse 71   Temp 97.3 °F (36.3 °C) (Temporal)   Resp 16   Ht 167.6 cm (66\")   Wt 108 kg (238 lb)   SpO2 96%   BMI 38.41 kg/m²  Body mass index is 38.41 kg/m².    PHYSICAL EXAM:   · CONSTITUTIONAL: A&Ox3, No acute distress  · LUNGS: Equal chest rise, no shortness of air  · CARDIOVASCULAR: palpable peripheral pulses  · SKIN: no skin lesions in the area examined  · LYMPH: no lymphadenopathy in the area examined  · EXTREMITY: Hip  · Pulses:  Brisk Capillary Refill  · Sensation: Intact to Saphenous, Sural, Deep Peroneal, Superficial Peroneal, and Tibial Nerves and grossly throughout extremity  · Motor: 5/5 EHL/FHL/TA/GS motor complexes    RADIOLOGY REVIEW:   XR Chest PA & Lateral    Result Date: 12/6/2021  No active disease.  Electronically Signed By-Stevie Frank MD On:12/6/2021 10:25 AM This report was finalized on [id number] by  Stevie Frank MD.      LABS:   Results for the past 24 hours:   Recent Results (from the past 24 hour(s))   Type & Screen    Collection Time: 12/10/21 12:39 PM    Specimen: Arm, Left; Blood   Result Value Ref Range    ABO Type A     RH type Positive        IMPRESSION:  Patient is a 66 y.o. Not  or  female with end-stage osteoarthritis of the left hip    PLAN: "   · Surgery: Elective total hip arthroplasty  · Consent: The risks and benefits of operative versus nonoperative treatment were discussed. The patient elected to undergo operative treatment of their hip. The risks discussed included but were not limited to blood clots, MI, stroke, other medical complications, infection, dislocation, fracture, damage to neurovascular structures, continued pain, hardware prominence, loss of range of motion, stiffness, need for further procedures, and and risk of anesthesia. No guarantees were made   · Disposition: Elective left Total Hip Arthroplasty today.    Alexis Hernandez II, MD  Orthopaedic Surgery  [facility]

## 2021-12-10 NOTE — ANESTHESIA POSTPROCEDURE EVALUATION
Patient: Ariana Michel    Procedure Summary     Date: 12/10/21 Room / Location: [facility] OR 11 / [facility] MAIN OR    Anesthesia Start: 1431 Anesthesia Stop: 1548    Procedure: TOTAL HIP ARTHROPLASTY ANTERIOR (Left Hip) Diagnosis:       Hip osteoarthritis      (Hip osteoarthritis [M16.9])    Surgeons: Alexis Hernandez II, MD Provider: Justin Celaya MD    Anesthesia Type: general ASA Status: 2          Anesthesia Type: general    Vitals  Vitals Value Taken Time   BP 97/57 12/10/21 1638   Temp 97.7 °F (36.5 °C) 12/10/21 1548   Pulse 66 12/10/21 1639   Resp 10 12/10/21 1618   SpO2 97 % 12/10/21 1639   Vitals shown include unvalidated device data.        Post Anesthesia Care and Evaluation    Patient location during evaluation: PACU  Patient participation: complete - patient participated  Level of consciousness: awake  Pain scale: See nurse's notes for pain score.  Pain management: adequate  Airway patency: patent  Anesthetic complications: No anesthetic complications  PONV Status: none  Cardiovascular status: acceptable  Respiratory status: acceptable  Hydration status: acceptable    Comments: Patient seen and examined postoperatively; vital signs stable; SpO2 greater than or equal to 90%; cardiopulmonary status stable; nausea/vomiting adequately controlled; pain adequately controlled; no apparent anesthesia complications; patient discharged from anesthesia care when discharge criteria were met

## 2021-12-10 NOTE — ANESTHESIA PREPROCEDURE EVALUATION
Anesthesia Evaluation     history of anesthetic complications: PONV               Airway   Mallampati: II  TM distance: >3 FB  No difficulty expected  Dental      Pulmonary    Cardiovascular     (+) hypertension,       Neuro/Psych  (+) psychiatric history,     GI/Hepatic/Renal/Endo      Musculoskeletal     Abdominal    Substance History      OB/GYN          Other   arthritis,                      Anesthesia Plan    ASA 2     general   total IV anesthesia  intravenous induction     Anesthetic plan, all risks, benefits, and alternatives have been provided, discussed and informed consent has been obtained with: patient.    Plan discussed with CAA.

## 2021-12-10 NOTE — OP NOTE
Anterior Total Hip Operative Note  Dr. CHELSEA Hernandez II  [phone]    PATIENT NAME: Ariana Michel  MRN: [id number]  : 1955 AGE: 66 y.o. GENDER: female  DATE OF OPERATION: 12/10/2021  PREOPERATIVE DIAGNOSIS: End stage Osteoarthritis  POSTOPERATIVE DIAGNOSIS: Same  OPERATION PERFORMED: Left Anterior Total Hip Arthroplasty  SURGEON: Alexis Hernandez MD  Circulator: Maryann Castillo RN; Wang Goldman RN  Scrub Person: Bhavna Fowler  Assistant: Faith Bryan  ANESTHESIA: General  ASSISTANT: Faith Kern. This case would not have been possible without another set of skilled surgical hands for retraction, use of instrumentation, and general assistance.  This assistance was vital to the success of the case.   ESTIMATED BLOOD LOSS: 300cc  SPONGE AND NEEDLE COUNT: Correct  INDICATIONS:  Arthritis: This patient was noted to have severe arthritis affecting the operative hip. They failed conservative management and elected to undergo total hip replacement. A discussion of operative versus nonoperative treatment was had. They elected to undergo anterior total hip arthroplasty. The risks of surgery were discussed and included the risk of anesthesia, infection, damage to neurovascular structures, implant loosening/failure, fracture, hardware prominence, dislocation, the need for further procedures, medical complications, and others. No guarantees were made. The patient wished to proceed with surgery and a surgical consent was signed.  COMPONENTS:   · Acetabular Cup: Smith [employer] Nephew R3 acetabular cup: 52 Outer Diameter  · Cup Screws: Smith & Nephew 6.5 mm screws: No Screws Were Used  · Smith & Nephew Neutral Acetabular Liner: Neutral  · Smith & Nephew Polar Femoral Stem: Size 2  · Smith & Nephew Oxinium Head: Dual Mobilitymm +4    PERTINENT FINDINGS: Arthritis: Endstage degenerative arthritis of the femoral head and acetabulum.    DETAILS OF PROCEDURE:   The patient was met in the preoperative area. The site was  marked. The consent and H&P were reviewed. The patient was then wheeled back to the operative suite underwent anesthesia. The [place] table boots were secured to the patients’ feet. The patient was moved onto the [place] table and secured in the supine position. The perineal post was inserted and the boots were secured into the leg holders. Surgical alcohol was used to thoroughly clean the operative area.     The hip and leg was then prepped in the normal sterile fashion, multiple layers of sterile drapes, and surgical space suits for the entire operative team. New outer gloves were used by all sterile surgical team members after final draping. The surgical incision was marked. A surgical timeout was performed.    A Modified Marquez-Sapp anterior approach was used. Dissection was carried down to the fascia. The fascia was incised and the tensor fascia kalpana muscle was retracted laterally and the sartorius medially. The lateral femoral circumflex vessels were identified and cauterized using bovie. The rectus femoris was retracted medially. A capsulotomy was then performed. The capsule was tagged with Ethibond for later repair. Retractors were placed on either side of the femoral neck and dissection was further carried down so that the lesser trochanter could be palpated and the superior rim of the acetabulum could be visualized.    The femoral neck cut was then made from  just proximal to the lesser trochanter medially headed towards the saddle laterally. Care was taken not to extend the cut into the lesser or greater trochanters. The head and neck segment were removed with a corkscrew. The acetabulum was then exposed. The labrum was removed using a kocher and scalpel and excess osteophytes were removed using an osteotome.    The acetabulum was progressively reamed, beginning with medialization and then finalizing the position of the reamer to approximate the final cup position. Fluoroscopy was used to ensure proper  placement of the reamer, including adequate medialization as well as appropriate abduction and anteversion. The real cup was then opened and inserted using fluoroscopy, ensuring good position in terms of abduction and anteversion.     No Screws: Initial press-fit fixation of the cup was very robust and no screws were required for supplemental fixation.    After thorough irrigation and ensuring that no soft tissue was entrapped within the cup, the real liner was snapped into place.    The hook was placed just distal to the greater trochanter. The femur was then externally rotated, extended and adducted under the well leg. Soft tissue releases were performed to gain exposure to the proximal femur. Capsule was released from the saddle and the lateral femur. Care was taken to preserve the short external rotator tendons. The capsule was also released along the medial femur. The hydraulic femoral lift was then used to better expose the femur. Further soft tissue releases were then performed, again ensuring preservation of the short external rotators.    The femur was machined with a cookie cutter osteotome and then a rasp was used to further lateralize the starting point. The sclerotic bone on the lateral shoulder of the femur was removed with a rongeour and curette as needed to protect the greater trochanter. Progressive broaches were inserted until adequate fill had been achieved. Using fluoroscopy, the femoral stem was visualized after trial reduction of the hip. The length and offset were compared to the non-operative hip. Trials of stem size and neck length were trialed until equal leg length and offset were obtained. Additionally hip stability was tested with internal and external rotation of the leg. The leg was stable with at least 90° of external rotation and there was no impingement at 60° of internal rotation. After implantation of the final stem and ball, the leg was once again brought into normal anatomic  "position and relocated. Final x-rays were taken with final implants noting good position of the stem and cup, and no visualized fracture.. The hip was stable upon reduction.    No Prophylactic Cable: Before final reduction of the hip, the proximal femur and femoral calcar was thoroughly visualized to ensure that there was no fracture/crack. The bone quality was thought to be sufficient enough that no prophylactic cable was needed for this case.    An analgesic cocktail was then injected about the hip as well as the surgical dissection area. The capsule was closed.  The fascia was then closed with a running stitch and the skin was closed in layers.  A sterile dressing was applied.    The patient was moved from the [place] table to the [facility] where the boots were removed. The patient was taken to the recovery room in stable condition. There were no complications and the patient tolerated the procedure well.    R \"Abundio\" David SANTILLAN MD  Orthopaedic Surgery  [facility]  [phone]              "

## 2021-12-11 VITALS
HEIGHT: 66 IN | OXYGEN SATURATION: 96 % | BODY MASS INDEX: 38.25 KG/M2 | RESPIRATION RATE: 17 BRPM | TEMPERATURE: 98.3 F | DIASTOLIC BLOOD PRESSURE: 60 MMHG | WEIGHT: 238 LBS | HEART RATE: 82 BPM | SYSTOLIC BLOOD PRESSURE: 98 MMHG

## 2021-12-11 LAB
ANION GAP SERPL CALCULATED.3IONS-SCNC: 8 MMOL/L (ref 5–15)
BUN SERPL-MCNC: 14 MG/DL (ref 8–23)
BUN/CREAT SERPL: 20.3 (ref 7–25)
CALCIUM SPEC-SCNC: 8.1 MG/DL (ref 8.6–10.5)
CHLORIDE SERPL-SCNC: 104 MMOL/L (ref 98–107)
CO2 SERPL-SCNC: 25 MMOL/L (ref 22–29)
CREAT SERPL-MCNC: 0.69 MG/DL (ref 0.57–1)
GFR SERPL CREATININE-BSD FRML MDRD: 86 ML/MIN/1.73
GLUCOSE SERPL-MCNC: 140 MG/DL (ref 65–99)
HCT VFR BLD AUTO: 33.9 % (ref 34–46.6)
HGB BLD-MCNC: 11.8 G/DL (ref 12–15.9)
POTASSIUM SERPL-SCNC: 4.6 MMOL/L (ref 3.5–5.2)
SODIUM SERPL-SCNC: 137 MMOL/L (ref 136–145)

## 2021-12-11 PROCEDURE — G0378 HOSPITAL OBSERVATION PER HR: HCPCS

## 2021-12-11 PROCEDURE — 85014 HEMATOCRIT: CPT | Performed by: ORTHOPAEDIC SURGERY

## 2021-12-11 PROCEDURE — 97161 PT EVAL LOW COMPLEX 20 MIN: CPT

## 2021-12-11 PROCEDURE — 25010000002 CEFAZOLIN PER 500 MG: Performed by: ORTHOPAEDIC SURGERY

## 2021-12-11 PROCEDURE — 80048 BASIC METABOLIC PNL TOTAL CA: CPT | Performed by: ORTHOPAEDIC SURGERY

## 2021-12-11 PROCEDURE — 85018 HEMOGLOBIN: CPT | Performed by: ORTHOPAEDIC SURGERY

## 2021-12-11 RX ORDER — PRAMIPEXOLE DIHYDROCHLORIDE 0.25 MG/1
0.25 TABLET ORAL EVERY 8 HOURS SCHEDULED
Status: DISCONTINUED | OUTPATIENT
Start: 2021-12-11 | End: 2021-12-11

## 2021-12-11 RX ORDER — OXYCODONE HYDROCHLORIDE AND ACETAMINOPHEN 5; 325 MG/1; MG/1
1 TABLET ORAL EVERY 4 HOURS PRN
Qty: 50 TABLET | Refills: 0 | Status: SHIPPED | OUTPATIENT
Start: 2021-12-11

## 2021-12-11 RX ORDER — ASPIRIN 81 MG/1
81 TABLET ORAL EVERY 12 HOURS SCHEDULED
Qty: 60 TABLET | Refills: 0 | Status: SHIPPED | OUTPATIENT
Start: 2021-12-11

## 2021-12-11 RX ORDER — PRAMIPEXOLE DIHYDROCHLORIDE 0.25 MG/1
0.25 TABLET ORAL EVERY 8 HOURS SCHEDULED
Status: DISCONTINUED | OUTPATIENT
Start: 2021-12-11 | End: 2021-12-11 | Stop reason: HOSPADM

## 2021-12-11 RX ADMIN — WATER 2 G: 1000 INJECTION, SOLUTION INTRAVENOUS at 04:01

## 2021-12-11 RX ADMIN — OXYCODONE 10 MG: 5 TABLET ORAL at 12:58

## 2021-12-11 RX ADMIN — FUROSEMIDE 20 MG: 20 TABLET ORAL at 08:24

## 2021-12-11 RX ADMIN — ASPIRIN 81 MG: 81 TABLET, COATED ORAL at 08:24

## 2021-12-11 RX ADMIN — PRAMIPEXOLE DIHYDROCHLORIDE 0.25 MG: 0.25 TABLET ORAL at 09:25

## 2021-12-11 RX ADMIN — MELOXICAM 15 MG: 15 TABLET ORAL at 08:24

## 2021-12-11 RX ADMIN — LOSARTAN POTASSIUM 25 MG: 25 TABLET, FILM COATED ORAL at 08:24

## 2021-12-11 RX ADMIN — ESCITALOPRAM OXALATE 10 MG: 10 TABLET ORAL at 08:24

## 2021-12-11 RX ADMIN — OXYCODONE 10 MG: 5 TABLET ORAL at 03:33

## 2021-12-11 RX ADMIN — OXYCODONE 10 MG: 5 TABLET ORAL at 08:24

## 2021-12-11 NOTE — PLAN OF CARE
Goal Outcome Evaluation:  Plan of Care Reviewed With: patient, daughter           Outcome Summary: Pt is a 65 yo F s/p L BOO on 12/10/21. Pt reporting prior independence with all of her ADLs at home. During session, pt requires VC for safe use of RW, but able to implement cueing appropriately. Pt demonstrates safe mobility with STS transfers and supine<>sit transfers, and appropriate positioning of RW. Requires minimal cueing to improve gait mechanics, but able to ambulate safely in hallway. Stair climbing practiced to prepare pt for dc home, and pt able to complete safety with family member. Pt safe to dc home with HH PT.

## 2021-12-11 NOTE — PROGRESS NOTES
Discharge Planning Assessment  [facility]     Patient Name: Ariana Michel  MRN: [id number]  Today's Date: 12/11/2021    Admit Date: 12/10/2021       Discharge Plan     Row Name 12/11/21 1530       Plan    Plan DC plan: unable to find [facility] to accept insurance. Referral made to  OP PT- Patricia.    Plan Comments CM received email from Tamera with Dr. Hernandez's office that dc plan includes [facility]. Verified dc plan with pt at bedside. Referral sent via Epic and orders verified. [facility] does not accept pt's insurance. Pt agreeable to OP PT at  OP PT- [facility]. Oders entered and referral made via UserEvents and called to [phone]. Email sent to [facility] ortho navigator and Tamera for f/u. Met with patient in room wearing PPE: mask, goggles. Maintained distance greater than six feet and spent less than 15 minutes in the room.              Continued Care and Services - Admitted Since 12/10/2021     Home Medical Care     Service Provider Request Status Selected Services Address Phone Fax Patient Preferred    [facility]  Declined N/A [address] [facility] [place] [address] [phone] --          Therapy     Service Provider Request Status Selected Services Address Phone Fax Patient Preferred    [facility] PHYSICAL THERAPY [place]  Pending - Request Sent N/A [address] [facility] [place] [address] [phone] --              Expected Discharge Date and Time     Expected Discharge Date Expected Discharge Time    Dec 11, 2021           Kim Huertas RN

## 2021-12-11 NOTE — DISCHARGE SUMMARY
Orthopaedic Discharge Summary  Dr. CHELSEA Strauss” [facility]  [phone]    NAME: Ariana Michel PCP: Debi Brandt MD   :  MRN: [id number]  [id number] LOS:  ADMIT: 0 days  12/10/2021   AGE/SEX: 66 y.o. 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]    · Surgery Performed: [place] TOTAL HIP ARTHROPLASTY [43636] (TOTAL HIP ARTHROPLASTY ANTERIOR)    · Discharge Medications:         Discharge Medications      New Medications      Instructions Start Date   aspirin 81 MG EC tablet   81 mg, Oral, Every 12 Hours Scheduled      oxyCODONE-acetaminophen 5-325 MG per tablet  Commonly known as: PERCOCET   1 tablet, Oral, Every 4 Hours PRN         Continue These Medications      Instructions Start Date   escitalopram 10 MG tablet  Commonly known as: LEXAPRO   10 mg, Oral, Every Morning, Take dos       furosemide 20 MG tablet  Commonly known as: LASIX   20 mg, Oral, Daily, Do not take dos       ibuprofen 800 MG tablet  Commonly known as: ADVIL,MOTRIN   800 mg, Oral, Every 6 Hours PRN, Stop 6-18 for surgery       irbesartan 75 MG tablet  Commonly known as: AVAPRO   75 mg, Oral, Every Morning, HOLD 24 hours before surgery      potassium chloride 10 MEQ CR tablet   10 mEq, Oral, Daily, Take dos       pramipexole 0.25 MG tablet  Commonly known as: MIRAPEX   0.25 mg, Oral, 3 Times Daily      PROBIOTIC FORMULA PO   Oral             · Vitals:     Vitals:    12/10/21 1731 12/10/21 2021 12/11/21 0009 21 0424   BP: 120/76 137/73 138/80 112/71   BP Location: Right arm Right arm Right arm Right arm   Patient Position:  Lying Sitting Lying   Pulse: 85 91 106 94   Resp: 16  15 14   Temp: 97.8 °F (36.6 °C) 98.4 °F (36.9 °C) 97.9 °F (36.6 °C) 98.2 °F (36.8 °C)   TempSrc: Oral Oral Oral Oral   SpO2: 97% 97% 91% 94%   Weight:       Height:           · Labs:      Admission on 12/10/2021   Component Date Value Ref Range Status   • ABO Type 12/10/2021 A   Final   •  RH type 12/10/2021 Positive   Final   • Antibody Screen 12/10/2021 Negative   Final   • T&S Expiration Date 12/10/2021 12/13/2021 11:59:59 PM   Final   • Hemoglobin 12/10/2021 13.4  12.0 - 15.9 g/dL Final   • Hematocrit 12/10/2021 39.6  34.0 - 46.6 % Final   • Glucose 12/11/2021 140* 65 - 99 mg/dL Final   • BUN 12/11/2021 14  8 - 23 mg/dL Final   • Creatinine 12/11/2021 0.69  0.57 - 1.00 mg/dL Final   • Sodium 12/11/2021 137  136 - 145 mmol/L Final   • Potassium 12/11/2021 4.6  3.5 - 5.2 mmol/L Final    Slight hemolysis detected by analyzer. Results may be affected.   • Chloride 12/11/2021 104  98 - 107 mmol/L Final   • CO2 12/11/2021 25.0  22.0 - 29.0 mmol/L Final   • Calcium 12/11/2021 8.1* 8.6 - 10.5 mg/dL Final   • eGFR Non  Amer 12/11/2021 86  >60 mL/min/1.73 Final   • BUN/Creatinine Ratio 12/11/2021 20.3  7.0 - 25.0 Final   • Anion Gap 12/11/2021 8.0  5.0 - 15.0 mmol/L Final   • Hemoglobin 12/11/2021 11.8* 12.0 - 15.9 g/dL Final   • Hematocrit 12/11/2021 33.9* 34.0 - 46.6 % Final        No results found.    · Hospital Course:   66 y.o. 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 Post-operatively the patient transferred to the floor where the patient underwent mobilization therapy. Opioids were titrated to achieve appropriate pain management to allow for participation in mobilization exercises. Vital signs and laboratory values are now within safe parameters for discharge. The dressings and/or incision is intact without signs or symptoms of active infection. Operative extremity neurovascular status remains intact as compared to the preoperative exam. Appropriate education re: incision care, activity levels, medications, and follow up visits was completed and all questions were answered.  "The patient is now deemed stable for discharge.    HOME: The patient progressed well with physical therapy. There were cleared for discharge to home. The patietn was sent home in good condition}.       R \"Abundio\" David SANTILLAN MD  Orthopaedic Surgery  [facility]  [phone]                                               "

## 2021-12-11 NOTE — DISCHARGE PLACEMENT REQUEST
"Ariana Michel (66 y.o. Female)             Date of Birth Social Security Number Address Home Phone MRN    1955  [address] [place] [address] [phone] [id number]    Yarsanism Marital Status             None        Admission Date Admission Type Admitting Provider Attending Provider Department, Room/Bed    12/10/21 Elective Alexis Hernandez II, MD Sweet, Richard Alexander II, MD [facility] SURGICAL INPATIENT, 4122/1    Discharge Date Discharge Disposition Discharge Destination           Home or Self Care              Attending Provider: Alexis Hernandez II, MD    Allergies: No Known Allergies    Isolation: None   Infection: None   Code Status: Not on file   Advance Care Planning Activity    Ht: 167.6 cm (66\")   Wt: 108 kg (238 lb)    Admission Cmt: None   Principal Problem: None                Active Insurance as of 12/10/2021     Primary Coverage     Payor Plan Insurance Group Employer/Plan Group    [employer]     Payor Plan Address Payor Plan Phone Number Payor Plan Fax Number Effective Dates    [address] [phone]  6/1/2021 - None Entered    [facility] [address]       Subscriber Name Subscriber Birth Date Member ID       ARIANA MICHEL 1955 [id number]                 Emergency Contacts      (Rel.) Home Phone Work Phone Mobile Phone    PADMINI MICHEL (Daughter) -- -- [phone]    duke michel (Spouse) -- -- [phone]    josefrancescae (Daughter) -- -- [phone]               History & Physical      Alexis Hernandez II, MD at 12/10/21 1330            Orthopaedic Surgery  History & Physical For Elective Total Hip  Dr. TRAN “Abundio” David SANTILLAN  [phone]    HPI:  Patient is a 66 y.o. Not  or  female who presents with End-stage arthritis of the left hip.  They failed conservative treatment of their hip pain and a thorough discussion of the risks and benefits of surgery was had.  The " patient wishes to continue with elective total hip replacement, they were scheduled and are here for surgery. They did get medical clearance as well as a thorough preoperative workup.     MEDICAL HISTORY  Past Medical History:   Diagnosis Date   • Anxiety    • Bilateral leg edema    • COVID-19 11/2020   • Crohn's disease (HCC)     not active   • Hypertension    • Osteoarthritis    • PONV (postoperative nausea and vomiting)    • RLS (restless legs syndrome)    ·   Past Surgical History:   Procedure Laterality Date   • LAPAROSCOPIC CHOLECYSTECTOMY  2014   ·   Prior to Admission medications    Medication Sig Start Date End Date Taking? Authorizing Provider   Bacillus Coagulans-Inulin (PROBIOTIC FORMULA PO) Take  by mouth.   Yes Dalton Cuello MD   escitalopram (LEXAPRO) 10 MG tablet Take 10 mg by mouth Every Morning. Take dos   Yes Dalton Cuello MD   furosemide (LASIX) 20 MG tablet Take 20 mg by mouth Daily. Do not take dos   Yes Dalton Cuello MD   irbesartan (AVAPRO) 75 MG tablet Take 75 mg by mouth Every Morning. HOLD 24 hours before surgery   Yes Dalton Cuello MD   potassium chloride 10 MEQ CR tablet Take 10 mEq by mouth Daily. Take dos   Yes Dalton Cuello MD   pramipexole (MIRAPEX) 0.25 MG tablet Take 0.25 mg by mouth 3 (Three) Times a Day.   Yes Dalton Cuello MD   ibuprofen (ADVIL,MOTRIN) 800 MG tablet Take 800 mg by mouth Every 6 (Six) Hours As Needed for Mild Pain . Stop 6-18 for surgery    Dalton Cuello MD   irbesartan (AVAPRO) 75 MG tablet irbesartan 75 mg tablet  12/10/21  Dalton Cuello MD   ·   · No Known Allergies  ·   · There is no immunization history on file for this patient.  Social History     Tobacco Use   • Smoking status: Never Smoker   • Smokeless tobacco: Not on file   Substance Use Topics   • Alcohol use: Not Currently   ·    Social History     Substance and Sexual Activity   Drug Use Not Currently   ·     REVIEW OF SYSTEMS:  · Head:  "negative for headache  · Respiratory: negative for shortness of breath.   · Cardiovascular: negative for chest pain.   · Gastrointestinal: negative abdominal pain.   · Neurological: negative for LOC  · Psychiatric/Behavioral: negative for memory loss.   · All other systems reviewed and are negative    VITALS: /77 (BP Location: Left arm, Patient Position: Sitting)   Pulse 71   Temp 97.3 °F (36.3 °C) (Temporal)   Resp 16   Ht 167.6 cm (66\")   Wt 108 kg (238 lb)   SpO2 96%   BMI 38.41 kg/m²  Body mass index is 38.41 kg/m².    PHYSICAL EXAM:   · CONSTITUTIONAL: A&Ox3, No acute distress  · LUNGS: Equal chest rise, no shortness of air  · CARDIOVASCULAR: palpable peripheral pulses  · SKIN: no skin lesions in the area examined  · LYMPH: no lymphadenopathy in the area examined  · EXTREMITY: Hip  · Pulses:  Brisk Capillary Refill  · Sensation: Intact to Saphenous, Sural, Deep Peroneal, Superficial Peroneal, and Tibial Nerves and grossly throughout extremity  · Motor: 5/5 EHL/FHL/TA/GS motor complexes    RADIOLOGY REVIEW:   XR Chest PA & Lateral    Result Date: 12/6/2021  No active disease.  Electronically Signed By-Stevie Frank MD On:12/6/2021 10:25 AM This report was finalized on [id number] by  Stevie Frank MD.      LABS:   Results for the past 24 hours:   Recent Results (from the past 24 hour(s))   Type & Screen    Collection Time: 12/10/21 12:39 PM    Specimen: Arm, Left; Blood   Result Value Ref Range    ABO Type A     RH type Positive        IMPRESSION:  Patient is a 66 y.o. Not  or  female with end-stage osteoarthritis of the left hip    PLAN:   · Surgery: Elective total hip arthroplasty  · Consent: The risks and benefits of operative versus nonoperative treatment were discussed. The patient elected to undergo operative treatment of their hip. The risks discussed included but were not limited to blood clots, MI, stroke, other medical complications, infection, dislocation, fracture, damage to " neurovascular structures, continued pain, hardware prominence, loss of range of motion, stiffness, need for further procedures, and and risk of anesthesia. No guarantees were made   · Disposition: Elective left Total Hip Arthroplasty today.    Alexis Hernandez II, MD  Orthopaedic Surgery  [facility]      Electronically signed by Alexis Hernandez II, MD at 12/10/21 1330          Physical Therapy Notes (last 24 hours)      Chapis Woodall PT at 21 1320  Version 1 of 1       Goal Outcome Evaluation:  Plan of Care Reviewed With: patient, daughter           Outcome Summary: Pt is a 67 yo F s/p L BOO on 12/10/21. Pt reporting prior independence with all of her ADLs at home. During session, pt requires VC for safe use of RW, but able to implement cueing appropriately. Pt demonstrates safe mobility with STS transfers and supine<>sit transfers, and appropriate positioning of RW. Requires minimal cueing to improve gait mechanics, but able to ambulate safely in hallway. Stair climbing practiced to prepare pt for dc home, and pt able to complete safety with family member. Pt safe to dc home with  PT.    Electronically signed by Chapis Woodall PT at 21 1428     Chapis Woodall PT at 21 1346  Version 1 of 1         Patient Name: Ariana Michel  : 1955    MRN: [id number]                              Today's Date: 2021       Admit Date: 12/10/2021    Visit Dx:     ICD-10-CM ICD-9-CM   1. Status post total replacement of left hip  Z96.642 V43.64     Patient Active Problem List   Diagnosis   • Status post total replacement of hip     Past Medical History:   Diagnosis Date   • Anxiety    • Bilateral leg edema    • COVID-19 2020   • Crohn's disease (HCC)     not active   • Hypertension    • Osteoarthritis    • PONV (postoperative nausea and vomiting)    • RLS (restless legs syndrome)      Past Surgical History:   Procedure Laterality Date   • LAPAROSCOPIC CHOLECYSTECTOMY         General Information     [facility] Name 12/11/21 1411          Physical Therapy Time and Intention    Document Type evaluation  -HS     Mode of Treatment individual therapy; physical therapy  -HS     Row Name 12/11/21 1411          General Information    Patient Profile Reviewed yes  -HS     Prior Level of Function independent:; all household mobility; community mobility; driving  -HS     Existing Precautions/Restrictions fall  -HS     Barriers to Rehab none identified  -HS     Row Name 12/11/21 1411          Living Environment    Lives With spouse  -HS     Row Name 12/11/21 1411          Home Main Entrance    Number of Stairs, Main Entrance three  -HS     Stair Railings, Main Entrance none  -HS     Row Name 12/11/21 1411          Cognition    Orientation Status (Cognition) oriented x 4  -HS           User Key  (r) = Recorded By, (t) = Taken By, (c) = Cosigned By    Initials Name Provider Type    HS Chapis Woodall PT Physical Therapist               Mobility     Row Name 12/11/21 1413          Bed Mobility    Bed Mobility bed mobility (all) activities  -     All Activities, [place] (Bed Mobility) verbal cues; supervision  -     Comment (Bed Mobility) sit to sidelying performed to practice for home  -HS     Row Name 12/11/21 1413          Sit-Stand Transfer    Sit-Stand [place] (Transfers) supervision; verbal cues  -     Assistive Device (Sit-Stand Transfers) walker, front-wheeled  -HS     Row Name 12/11/21 1413          Gait/Stairs (Locomotion)    Jer[place] Level (Gait) supervision  Pt requires B UE support to mimic lack of HR at home; able to perform safety without assist for balance  -HS     Assistive Device (Gait) walker, front-wheeled  -HS     Distance in Feet (Gait) 200'x2  -HS     Bilateral Gait Deviations --  B decrease in knee flexion; requires cueing for heel toe gait pattern  -HS     [place] Level (Stairs) contact guard  Pt requires B UE support to mimic lack of HR at home; able to  perform safety without assist for balance  -HS     Number of Steps (Stairs) 8  -HS     Ascending Technique (Stairs) step-to-step  -HS     Descending Technique (Stairs) step-to-step  -HS     Comment (Gait/Stairs) Practiced stair climbing with family providing assist to prepare for home  -HS     Row Name 12/11/21 1413          Mobility    Extremity Weight-bearing Status left lower extremity  -HS     Left Lower Extremity (Weight-bearing Status) weight-bearing as tolerated (WBAT)  -HS           User Key  (r) = Recorded By, (t) = Taken By, (c) = Cosigned By    Initials Name Provider Type     Chapis Woodall PT Physical Therapist               Obj/Interventions     Row Name 12/11/21 1419          Range of Motion Comprehensive    General Range of Motion bilateral upper extremity ROM WFL  -HS     Comment, General Range of Motion Pt able to move B LE through functional ROM.  -HS     Row Name 12/11/21 1419          Strength Comprehensive (MMT)    Comment, General Manual Muscle Testing (MMT) Assessment B UE and LE WFL per assessment of mobility  -HS     Row Name 12/11/21 1419          Motor Skills    Therapeutic Exercise hip  handout provided; pt reporting understanding  -HS     Row Name 12/11/21 1419          Balance    Comment, Balance Pt with good seated balance, requires RW for standing balance  -HS           User Key  (r) = Recorded By, (t) = Taken By, (c) = Cosigned By    Initials Name Provider Type     Chapis Woodall PT Physical Therapist               Goals/Plan    No documentation.                Clinical Impression     Row Name 12/11/21 1420          Pain    Additional Documentation --  Pt reporting minimal to moderate pain in L hip, does not rate  -HS     Row Name 12/11/21 1420          Plan of Care Review    Plan of Care Reviewed With patient; daughter  -     Outcome Summary Pt is a 65 yo F s/p L BOO on 12/10/21. Pt reporting prior independence with all of her ADLs at home. During session, pt requires VC for  safe use of RW, but able to implement cueing appropriately. Pt demonstrates safe mobility with STS transfers and supine<>sit transfers, and appropriate positioning of RW. Requires minimal cueing to improve gait mechanics, but able to ambulate safely in hallway. Stair climbing practiced to prepare pt for dc home, and pt able to complete safety with family member. Pt safe to dc home with HH PT.  -HS     Row Name 12/11/21 1420          Therapy Assessment/Plan (PT)    Criteria for Skilled Interventions Met (PT) no; does not meet criteria for skilled intervention  -HS     Row Name 12/11/21 1420          Vital Signs    O2 Delivery Pre Treatment room air  -HS     O2 Delivery Post Treatment room air  -HS     Pre Patient Position Sitting  -HS     Post Patient Position Sitting  -HS     Row Name 12/11/21 1420          Positioning and Restraints    Pre-Treatment Position in bed  -HS     Post Treatment Position bed  -HS     In Bed notified nsg; with family/caregiver  Pt reports being up ad michi in room  -HS           User Key  (r) = Recorded By, (t) = Taken By, (c) = Cosigned By    Initials Name Provider Type    Chapis Solis, PT Physical Therapist               Outcome Measures    No documentation.                              Physical Therapy Education                 Title: PT OT SLP Therapies (Done)     Topic: Physical Therapy (Done)     Point: Mobility training (Done)     Learning Progress Summary           Patient Acceptance, E, VU by HS at 12/11/2021 1427                   Point: Home exercise program (Done)     Learning Progress Summary           Patient Acceptance, E, VU by HS at 12/11/2021 1427                   Point: Body mechanics (Done)     Learning Progress Summary           Patient Acceptance, E, VU by HS at 12/11/2021 1427                   Point: Precautions (Done)     Learning Progress Summary           Patient Acceptance, E, VU by HS at 12/11/2021 1427                               User Key     Initials  Effective Dates Name Provider Type Discipline     21 -  Chapis Woodall PT Physical Therapist PT              PT Recommendation and Plan     Plan of Care Reviewed With: patient, daughter  Outcome Summary: Pt is a 67 yo F s/p L BOO on 12/10/21. Pt reporting prior independence with all of her ADLs at home. During session, pt requires VC for safe use of RW, but able to implement cueing appropriately. Pt demonstrates safe mobility with STS transfers and supine<>sit transfers, and appropriate positioning of RW. Requires minimal cueing to improve gait mechanics, but able to ambulate safely in hallway. Stair climbing practiced to prepare pt for dc home, and pt able to complete safety with family member. Pt safe to dc home with HH PT.     Time Calculation:    PT Charges     Row Name 21 1428             Time Calculation    Start Time 1320  -HS      Stop Time 1346  -HS      Time Calculation (min) 26 min  -HS      PT Received On 21  -            User Key  (r) = Recorded By, (t) = Taken By, (c) = Cosigned By    Initials Name Provider Type     Chapis Woodall PT Physical Therapist              Therapy Charges for Today     Code Description Service Date Service Provider Modifiers Qty    [id number] HC PT EVAL LOW COMPLEXITY 4 2021 Chapis Woodall PT GP 1               Chapis Woodall PT  2021      Electronically signed by Chapis Woodall PT at 21 1430       [facility] SURGICAL INPATIENT  [address] [facility] [place] [address]  Phone:  [phone]  Fax:  [phone] Date: Dec 11, 2021      Ambulatory Referral to Home Health     Patient:  Ariana Michel MRN:  [id number]   [address] [facility] [place] [address] :  1955  SSN:    Phone: [phone] Sex:  F      INSURANCE PAYOR PLAN GROUP # SUBSCRIBER ID   Primary:    [employer] [id number] N [id number]      Referring Provider Information:  NOLBERTO ESTEVES II Phone: [phone] Fax: [phone]      Referral  Information:   # Visits:  999 Referral Type: Home Health [42]   Urgency:  Routine Referral Reason: Specialty Services Required   Start Date: Dec 11, 2021 End Date:  To be determined by Insurer   Diagnosis: Status post total replacement of left hip (Z96.642 [ICD-10-CM] V43.64 [ICD-9-CM])      Refer to Dept:   Refer to Provider:   Refer to Facility:       Face to Face Visit Date: 2021  Follow-up provider for Plan of Care? I will be treating the patient on an ongoing basis.  Please send me the Plan of Care for signature.  Follow-up provider: NOLBERTO ESTEVES II [[id number]]  Reason/Clinical Findings: post hospital eval  Describe mobility limitations that make leaving home difficult: impaired mobility  Nursing/Therapeutic Services Requested: Physical Therapy (c to eval)  Frequency: 1 Week 1     This document serves as a request of services and does not constitute Insurance authorization or approval of services.  To determine eligibility, please contact the members Insurance carrier to verify and review coverage.     If you have medical questions regarding this request for services. Please contact [facility] SURGICAL INPATIENT at [phone] during normal business hours.         Verbal Order Mode: Telephone with readback   Authorizing Provider: Nolberto Esteves II, MD  Authorizing Provider's NPI: [id number]     Order Entered By: Kim Huertas RN 2021  1:57 PM        [facility] SURGICAL INPATIENT  [address] [facility] [place] [address]  Phone:  [phone]  Fax:  [phone] Date: Dec 11, 2021      Ambulatory Referral to Physical Therapy Evaluate and treat     Patient:  Ariana Michel MRN:  [id number]   [address]  [place] [address] :  1955  SSN:    Phone: [phone] Sex:  F      INSURANCE PAYOR PLAN GROUP # SUBSCRIBER ID   Primary:    [employer] [id number] [facility] [id number]      Referring Provider Information:  NOLBERTO ESTEVES II  Phone: [phone] Fax: [phone]      Referral Information:   # Visits:  1 Referral Type: Physical Therapy [AE1]   Urgency:  Routine Referral Reason: Specialty Services Required   Start Date: Dec 11, 2021 End Date:  To be determined by Insurer   Diagnosis: Status post total replacement of left hip (Z96.642 [ICD-10-CM] V43.64 [ICD-9-CM])      Refer to Dept:   Refer to Provider:   Refer to Facility:       Specialty needed: Evaluate and treat     This document serves as a request of services and does not constitute Insurance authorization or approval of services.  To determine eligibility, please contact the members Insurance carrier to verify and review coverage.     If you have medical questions regarding this request for services. Please contact [facility] SURGICAL INPATIENT at [phone] during normal business hours.         Verbal Order Mode: Telephone with readback   Authorizing Provider: Alexis Hernandez II, MD  Authorizing Provider's NPI: [id number]     Order Entered By: Kim Huertas RN 12/11/2021  3:34 PM

## 2021-12-11 NOTE — DISCHARGE INSTRUCTIONS
Total Hip  Discharge Instructions  Dr. CHELSEA Strauss” [place] II  [phone]    • INCISION CARE  o Wash your hands prior to dressing changes  o ENA Wound VAC: Postoperatively you had a ENA Wound Vac placed on the incision. This was placed under sterile conditions in the operating room. It remains in place for 7 days postoperatively. After 7 days, the entire dressing must be removed, including all of the sticky adhesive. The dressing and battery pack provide gentle suction to the incision and provide several benefits over a traditional dressing:  - It maintains the sterile environment of the OR and reduces the risk of infection  - The suction removes unwanted buildup of blood/hematoma under the skin to reduce swelling  - The suction also promotes fresh blood supply to the skin and soft tissue to speed up healing  - The postoperative scar is reduced in size  - Showering is permitted immediately after surgery, but the battery pack must be protected or removed during the shower.   o After 7 days the ENA Wound Vac is removed. If there is no drainage, no dressing is required. If there is some scant drainage a dry bandage can be applied and changed daily until seen in the office or until the drainage stops.   o No creams or ointments to the incisions until 4 weeks post op.  o Do not touch or pick at the incision  o Check incision every day and notify surgeon immediately if any of the following signs or symptoms are seen:  - Increase in redness  - Increase in swelling around the incision and of the entire extremity  - Increase in pain  - NEW drainage or oozing from the incision  - Pulling apart of the edges of the incision  - Increase in overall body temperature (greater than 100.4 degrees)  o Zip-Line: your incision was closed with a state of the art device.   - Is a non-invasive and easy to use wound closure device that replaces sutures, staples and glue for surgical incisions  - It minimizes scarring and eliminating  “railroad” marks that come with staples or sutures  - It makes removal as atraumatic as peeling off a bandage  - Can be removed at home or by a physical therapist or nurse at 14 days postoperatively    • ACTIVITIES  o Exercises:  - Physical therapy will begin immediately while in the hospital. Patients going to a nursing home will get therapy as part of their care at the SNU/SNF facility. Patients going home may also have a therapist come to the house to help them mobilize until they can safely get to an outpatient therapy facility.  - Elevate the affected leg most of the day during the first week post operatively. Caution must be taken to avoid pillow placement directly under the heel of the leg, as this can cause pressure ulcers even with a soft pillow. All pillows and blankets should be placed underneath of the thigh and calf so that the heel is free-floating.  - Use cold packs for 20-30 minutes approximately 5 times per day.  - You should perform the daily stretching and strengthening exercises as taught by the therapist as often as possible. This can be done many times a day.  - Full weight bearing is allowed after surgery. It will be sore/painful to put weight on the leg, but this will help the bone to heal and prevent complications such as pneumonia, bed sores and blood clots. Mobilization is vital to the recovery process.  o Activities of Daily Living:  - No tub baths, hot tubs, or swimming pools for 4 weeks.  - May shower and let water run over the incision immediately after surgery. The battery pack of the ENA Wound Vac must be protected or removed while in the shower. After the ENA is removed 7 days after surgery showering is permitted as long as there is no drainage from the wound.     • Restrictions  o Weight: It is ok to allow full weight bearing after surgery. Weight on the leg actually quickens the recovery process. While it will be sore/painful to put weight on the leg, it is safe to do so. Hip  replacement after hip fracture has a much slower recover process. It can take months to heal fully from a hip fracture and patients even make some slow benefits up to a year afterwards.   o Driving: Many patients have questions about when it is safe to return to driving. The answer is that this is extremely variable. It depends on the extent of the surgery, as well as how quickly you heal. Certainly left leg surgeries make returning to driving easier while right leg surgeries require more extensive rehabilitation before driving can be safe. Until you can press down on the brake hard, and are off of all narcotics, driving is not permitted. Your surgeon cannot “clear” you to return to driving, only you can make the decision when you feel it is safe.    • Medications  o Anticoagulants: After upper extremity surgery most patients do not require an anticoagulant unless you have another injury that will be keeping you from mobilizing. Lower extremity surgery typically does require use of an anticoagulation medicine.   - IF YOU HAD LOWER EXTREMITY SURGERY AND ARE NOT DISCHARGED HOME WITH ANY ANTICOAGULANT MEDICINE YOU SHOULD TAKE ASPIRIN 325mg DAILY FOR 30 DAYS POSTOPERATIVELY.  - If you are discharged home with an anticoagulant such as Aspirin, Xarelto, Eliquis, Coumadin, or Lovenox, follow these simple instructions:   - Notify surgeon immediately if any jaelyn bleeding is noted in the urine, stool, emesis, or from the nose or the incision. Blood in the stool will often appear as black rather than red. Blood in urine may appear as pink. Blood in emesis may appear as brown/black like coffee grounds.  - You will need to apply pressure for longer periods of time to any cuts or abrasions to stop bleeding  - Avoid alcohol while taking anticoagulants  - Most anticoagulants are to be taken for 30 days postoperatively. After this time, you may stop using them unless instructed otherwise.   - If you were already taking an  anticoagulant (commonly Aspirin, Coumadin, or Plavix) you will likely be resuming your normal dose postoperatively and will be continuing that medication at the discretion of the prescribing physician.  o Stool Softeners: You will be at greater risk of constipation after surgery due to being less mobile and the pain medications.  - Take stool softeners as needed. Over the counter Colace 100 mg 1-2 capsules twice daily can be taken.  - If stools become too loose or too frequent, please decreases the dosage or stop the stool softener.  - If constipation occurs despite use of stool softeners, you are to continue the stool softeners and add a laxative (Milk of Magnesia 1 ounce daily as needed)  - Drink plenty of fluids, and eat fruits and vegetables during your recovery time. Getting up and mobilizing will help the bowels to recover their regular function, as will weaning off of all narcotics when the pain becomes tolerable.  o Pain Medications: Utilized after surgery are narcotics. This is some general information about these medications.  - CLASSIFICATION: Pain medications are called Opioids and are narcotics  - LEGALITIES: It is illegal to share narcotics with others  - DRIVING: it is illegal to drive while under the influence of narcotics. Doing so is a DUI.  - POTENTIAL SIDE EFFECTS: nausea, vomiting, itching, dizziness, drowsiness, dry mouth, constipation, and difficulty urinating.  - POTENTIAL ADVERSE EFFECTS:  - Opioid tolerance can develop with use of pain medications and this simply means that it requires more and more of the medication to control pain. However, this is seen more in patients that use opioids for longer periods of time.  - Opioid dependence can develop with use of Opioids. People with opioid dependence will experience withdrawal symptoms upon cessation of the medication.  - Opioid addiction can develop with use of Opioids. The incidence of this is very unlikely in patients who take the  medications as ordered and stop the medications as instructed.  - Opioid overdose can be dangerous, but is unlikely when the medication is taken as ordered and stopped when ordered. It is important not to mix opioids with alcohol as this can lead to over sedation and respiratory difficulty.  - DOSAGE:  - After the initial surgical pain begins to resolve, you may begin to decrease the pain medication. By the end of a few weeks, you should be off of pain medications.  - Refills will not be given by the office during evening hours, on weekends, or after 6 weeks post-op. You are responsible for weaning off of pain medication. You can increase the time between narcotic pills, taking one every 4 then 6 then 8 hours and so on.  - To seek refills on pain medications during the initial 6-week post-operative period, you must call the office to request the refill. The office will then notify you when to  the prescription. DO NOT wait until you are out of the medication to request a refill. Prescriptions will not be filled over the weekend and depending on the schedule, it may take a couple days for the prescription to be available. Someone will have to pick the prescription in person at the office.    • FOLLOW-UP VISITS  o You will need to follow up in the office with your surgeon in 3 weeks, or as instructed elsewhere in your discharge paperwork. Please call this number [phone] to schedule this appointment. If you are going to an SNF/SNU facility, they will arrange for you to follow up in the office.  o If you have any concerns or suspected complications prior to your follow up visit, please call the office. Do not wait until your appointment time if you suspect complications. These will need to be addressed in the office promptly.      Alexis Hernandez II, MD  Orthopaedic Surgery  [facility]

## 2021-12-11 NOTE — PLAN OF CARE
Goal Outcome Evaluation:           Progress: improving  Outcome Summary: Has been walking with one assist with use of a walker to the bathroo. Has audrey well. Did require one dose of Dilaudid this shift due to pain.

## 2021-12-13 NOTE — CASE MANAGEMENT/SOCIAL WORK
Case Management Discharge Note      Final Note: home         Selected Continued Care - Discharged on 12/11/2021 Admission date: 12/10/2021 - Discharge disposition: Home or Self Care                 Final Discharge Disposition Code: 01 - home or self-care

## 2021-12-16 ENCOUNTER — TREATMENT (OUTPATIENT)
Dept: PHYSICAL THERAPY | Facility: CLINIC | Age: 66
End: 2021-12-16

## 2021-12-16 DIAGNOSIS — R26.2 DIFFICULTY IN WALKING: ICD-10-CM

## 2021-12-16 DIAGNOSIS — Z96.642 STATUS POST LEFT HIP REPLACEMENT: Primary | ICD-10-CM

## 2021-12-16 DIAGNOSIS — M25.559 PAIN, HIP: ICD-10-CM

## 2021-12-16 PROCEDURE — 97161 PT EVAL LOW COMPLEX 20 MIN: CPT | Performed by: PHYSICAL THERAPIST

## 2021-12-16 NOTE — PROGRESS NOTES
Physical Therapy Initial Evaluation and Plan of Care    Patient: Ariana Michel   : 1955  Diagnosis/ICD-10 Code:  Status post left hip replacement [Z96.642]  Referring practitioner: Alexis Hernandez*  Date of Initial Visit: 2021  Today's Date: 2021  Patient seen for 1 sessions           Subjective Questionnaire: Oxford Hip score = 19/48, indicating 40% ability and 60% impairment      Subjective Evaluation    History of Present Illness  Date of surgery: 12/10/2021  Mechanism of injury: Pt underwent anterior L THR on 12/10/2021. Pt stayed overnight and returned home the next day. Pt attempted to have home health services but insurance denied. Pt states her pain is so much better since surgery other than surgical pain. Has been having swelling in L LE surgery. Having difficulty sleeping and is uncomfortable in bed. Has been sleeping in recliner. Pt wanting to do more at home. Has been using Ice ~5x/day. Taking pain med prn. Pain since surgery is mainly at proximal anterior lateral L thigh. Difficulty lifting leg ing/out car and on/off bed. Using rollator all the time. States before surgery she could not bend L hip fully to sit down and had to help lift L leg. States L leg is weak. Wants to be able to walk outside again.  PLOF: independent with all activities.      Patient Occupation: cleans houses Quality of life: good    Pain  Current pain ratin  At best pain ratin  At worst pain rating: 10  Location: L hip  Relieving factors: medications and ice  Aggravating factors: lifting, movement, standing, stairs, ambulation, prolonged positioning, sleeping and repetitive movement  Progression: improved    Social Support  Lives in: one-story house (3 steps to enter)  Lives with: spouse    Patient Goals  Patient goals for therapy: decreased edema, decreased pain, improved balance, increased motion, increased strength, independence with ADLs/IADLs, return to work and return to sport/leisure  activities             Objective          Observations     Right Hip  Positive for edema.     Additional Hip Observation Details  L anterior THR incision covered with waterproof dressing, mini-vac still in place. No redness.   Gait: ambulating with rollator, good foot clearance, decreased renuka, flexed posture at hips.    Active Range of Motion     Additional Active Range of Motion Details  Severely limited L hip AROM in all directions due to weakness.  PROM WFL to be expected after THR.    Strength/Myotome Testing     Left Hip   Normal muscle strength    Right Hip   Planes of Motion   Flexion: 2  Abduction: 2+    Left Knee   Flexion: 4  Extension: 4-    Right Knee   Normal strength    Additional Strength Details  Able to only minimal perform R hip flex seated or in standing.  B ankle DF = 5/5    Left Hip Flexibility Comments:   Expected tightness present L hip flexors.          Assessment & Plan     Assessment  Impairments: abnormal gait, abnormal muscle firing, abnormal or restricted ROM, activity intolerance, impaired balance, impaired physical strength, lacks appropriate home exercise program, pain with function, safety issue and weight-bearing intolerance  Functional Limitations: carrying objects, lifting, sleeping, walking, uncomfortable because of pain, moving in bed, sitting, standing, stooping and unable to perform repetitive tasks  Assessment details: Pt is 67 yo female 1 wk s/p anterior L THR. Pt presents with decreased strength and ROM of L hip. She is having difficulty with bed mobility. Difficulty lifting L LE in/out of car. Difficulty sleeping. Difficulty with stairs. Unable to perform housework tasks. Unable to work. Oxford hip score indicates 60% impairment.    Patient presents with the impairments listed above and based on the objective findings and the physical therapy evaluation, the patient’s condition has the potential to improve in response to therapy.   The patient’s condition and/or  services required are at a level of complexity that necessitates the skill & supervision of a physical therapist.      Prognosis: good    Goals  Plan Goals: STG:  - Pt to perform L SLR x5 reps without difficulty in 3 weeks.  - Pt to report 50% ease with getting in/out of car in 3 weeks.  - Pt to be independent with HEP in 3 weeks.  LTG:  - Improve Oxford hip score to 25% or less impairment by discharge.  - Increase L hip flex strength to 4-/5 or better by discharge.  - Ambulation to be returned to OF with no limping by discharge.  - Pt to tolerate sleeping in bed all night by discharge.    Plan  Therapy options: will be seen for skilled therapy services  Planned modality interventions: thermotherapy (hydrocollator packs), cryotherapy and electrical stimulation/Russian stimulation  Planned therapy interventions: abdominal trunk stabilization, balance/weight-bearing training, body mechanics training, flexibility, functional ROM exercises, gait training, home exercise program, manual therapy, neuromuscular re-education, soft tissue mobilization, strengthening, stretching, therapeutic activities and transfer training  Frequency: 2x week  Duration in weeks: 12  Treatment plan discussed with: patient  Plan details: Instructed pt in QS, GS, heel slides with strap, standing hip abd, and LAQ. Encouraged pt to practice standing up straight to correct flexed posture observed today. Pt verbalized good understanding of all education done this date.        Timed:         Manual Therapy:         mins  54719;     Therapeutic Exercise:    5     mins  19099;     Neuromuscular Max:        mins  45622;    Therapeutic Activity:          mins  73570;     Gait Training:           mins  38593;     Ultrasound:          mins  82456;    Ionto                                   mins   11577  Can Repos          mins 36932  Self - Care                          mins  41866    Un-Timed:  Electrical Stimulation:         mins  56427 (  );  Dry Needling          20561/61068  Traction          mins 98586  Low Eval     40     Mins  62350  Mod Eval          Mins  69875  High Eval                            Mins  45709      Timed Treatment:   5   mins   Total Treatment:     45   mins      PT SIGNATURE: Lety Kwong PT, CLT, Cert DN  [place] Lic # [id number]  Electronically signed by Lety Kwong PT, 12/16/21, 1:37 PM EST    Initial Certification  Certification Period: 12/16/2021 thru 3/15/2022  I certify that the therapy services are furnished while this patient is under my care.  The services outlined above are required by this patient, and will be reviewed every 90 days.     PHYSICIAN: Alexis Hernandez II, MD ____________________________________________________     DATE:  ____________________________________________    Please sign and return via fax to [phone]. Thank you, [facility].

## 2021-12-20 ENCOUNTER — TREATMENT (OUTPATIENT)
Dept: PHYSICAL THERAPY | Facility: CLINIC | Age: 66
End: 2021-12-20

## 2021-12-20 DIAGNOSIS — M25.559 PAIN, HIP: ICD-10-CM

## 2021-12-20 DIAGNOSIS — Z96.642 STATUS POST LEFT HIP REPLACEMENT: Primary | ICD-10-CM

## 2021-12-20 PROCEDURE — 97110 THERAPEUTIC EXERCISES: CPT | Performed by: PHYSICAL THERAPIST

## 2021-12-20 NOTE — PROGRESS NOTES
Physical Therapy Daily Progress Note      Patient: Ariana Michel   : 1955  Diagnosis/ICD-10 Code:  Status post left hip replacement [Z96.642]  Referring practitioner: Alexis Hernandez*.  Follow up 22  Date of Initial Visit: 2021  Today's Date: 2021  Patient seen for 2 sessions. POC 2x/wk x 12 weeks, exp 3/15/22        VISIT#: 2      Subjective :  No pain, just pulling from zip tie closure .  She is not sleeping well.  She has been doing HEP.       Objective :  PATIENT LATE     See Exercise, Manual, and Modality Logs for complete treatment.       Patient Education:      Assessment/Plan:  Treatment limited due to patient being late. Pt. Unable to lift LE unto plinth or NuStep and uses R UE to perform.  She is weak and needs assist for transitional movements and some there ex. Gait with rollator.       Progress per Plan of Care            Timed:         Manual Therapy:         mins  86981;     Therapeutic Exercise:     25    mins  82799;     Neuromuscular Max:        mins  18990;    Therapeutic Activity:          mins  66378;     Gait Training:           mins  83345;     Ultrasound:          mins  87397;    Ionto                                   mins   92230  Self Care                            mins   66983  Aquatic                               mins 04512    Un-Timed:  Electrical Stimulation:         mins  31682 ( );  Traction          mins 63919      Timed Treatment:  25    mins   Total Treatment:     25   mins    Monae Oliva PTA  Physical Therapist Assistant   [place] license:  #[id number]

## 2021-12-22 ENCOUNTER — TREATMENT (OUTPATIENT)
Dept: PHYSICAL THERAPY | Facility: CLINIC | Age: 66
End: 2021-12-22

## 2021-12-22 DIAGNOSIS — Z96.642 STATUS POST LEFT HIP REPLACEMENT: Primary | ICD-10-CM

## 2021-12-22 PROCEDURE — 97112 NEUROMUSCULAR REEDUCATION: CPT | Performed by: PHYSICAL THERAPIST

## 2021-12-22 PROCEDURE — 97110 THERAPEUTIC EXERCISES: CPT | Performed by: PHYSICAL THERAPIST

## 2021-12-22 NOTE — PROGRESS NOTES
Physical Therapy Daily Progress Note      Patient: Ariana Michel   : 1955  Diagnosis/ICD-10 Code:  Status post left hip replacement [Z96.642]  Referring practitioner: Alexis Hernandez*.  Follow up 22  Date of Initial Visit: 2021  Today's Date: 2021  Patient seen for 3 sessions. POC 2x/wk x 12 weeks, exp 3/15/22        VISIT#: 3      Subjective :  Pain R groin 2/10.  She did well after last session.  She loves the NuStep.       Objective :      See Exercise, Manual, and Modality Logs for complete treatment. Progression as noted. Gait training with single tip cane.       Patient Education:  Gait with single tip cane in her home.       Assessment/Plan:  Pt. Anxious and fearful regarding pain so movement is somewhat limited due to this.  She was able to progress without issue.  She did well with [facility] for gait and instructed to begin this in her home.     Progress per Plan of Care            Timed:         Manual Therapy:         mins  74982;     Therapeutic Exercise:     20    mins  73317;     Neuromuscular Max:   10     mins  10239;    Therapeutic Activity:          mins  76831;     Gait Training:           mins  13011;     Ultrasound:          mins  08617;    Ionto                                   mins   70735  Self Care                            mins   36496  Aquatic                               mins 05778    Un-Timed:  Electrical Stimulation:         mins  87583 ( );  Traction          mins 16477      Timed Treatment:  30   mins   Total Treatment:     30   mins    Monae Oliva PTA  Physical Therapist Assistant   [place] license:  #[id number]

## 2021-12-29 ENCOUNTER — TREATMENT (OUTPATIENT)
Dept: PHYSICAL THERAPY | Facility: CLINIC | Age: 66
End: 2021-12-29

## 2021-12-29 DIAGNOSIS — Z96.642 STATUS POST LEFT HIP REPLACEMENT: Primary | ICD-10-CM

## 2021-12-29 PROCEDURE — 97112 NEUROMUSCULAR REEDUCATION: CPT | Performed by: PHYSICAL THERAPIST

## 2021-12-29 PROCEDURE — 97110 THERAPEUTIC EXERCISES: CPT | Performed by: PHYSICAL THERAPIST

## 2021-12-29 NOTE — PROGRESS NOTES
Physical Therapy Daily Progress Note      Patient: Ariana Michel   : 1955  Diagnosis/ICD-10 Code:  Status post left hip replacement [Z96.642]  Referring practitioner: Alexis Hernandez*.  Follow up 22  Date of Initial Visit: 2021  Today's Date: 2021  Patient seen for 4 sessions. POC 2x/wk x 12 weeks, exp 3/15/22        VISIT#: 4      Subjective :  No  Pain currently.  Pt. Left walker in car and used cane to enter department. Pt. Wants to learn how to go up and down steps.       Objective :      See Exercise, Manual, and Modality Logs for complete treatment. Progression as noted. Gait training with single tip cane.       Patient Education:        Assessment/Plan:  Pt. With significant progression today.  Gait with cane steady with minimal deficit.  Goals met as noted.     Plan Goals: STG:  - Pt to perform L SLR x5 reps without difficulty in 3 weeks.  - Pt to report 50% ease with getting in/out of car in 3 weeks.MET  - Pt to be independent with HEP in 3 weeks MET.  LTG:  - Improve Oxford hip score to 25% or less impairment by discharge.  - Increase L hip flex strength to 4-/5 or better by discharge.  - Ambulation to be returned to PLOF with no limping by discharge.  - Pt to tolerate sleeping in bed all night by discharge.    Progress per Plan of Care            Timed:         Manual Therapy:         mins  34451;     Therapeutic Exercise:     20    mins  30048;     Neuromuscular Max:   10     mins  57089;    Therapeutic Activity:          mins  50051;     Gait Training:           mins  43730;     Ultrasound:          mins  86352;    Ionto                                   mins   83961  Self Care                            mins   52767  Aquatic                               mins 08286    Un-Timed:  Electrical Stimulation:         mins  47466 ( );  Traction          mins 73572      Timed Treatment:  30   mins   Total Treatment:     30   mins    Monae Oliva PTA  Physical Therapist  Assistant   [place] license:  #[id number]

## 2021-12-30 ENCOUNTER — TREATMENT (OUTPATIENT)
Dept: PHYSICAL THERAPY | Facility: CLINIC | Age: 66
End: 2021-12-30

## 2021-12-30 DIAGNOSIS — M25.559 PAIN, HIP: ICD-10-CM

## 2021-12-30 DIAGNOSIS — R26.2 DIFFICULTY IN WALKING: ICD-10-CM

## 2021-12-30 DIAGNOSIS — Z96.642 STATUS POST LEFT HIP REPLACEMENT: Primary | ICD-10-CM

## 2021-12-30 PROCEDURE — 97110 THERAPEUTIC EXERCISES: CPT | Performed by: PHYSICAL THERAPIST

## 2021-12-30 PROCEDURE — 97112 NEUROMUSCULAR REEDUCATION: CPT | Performed by: PHYSICAL THERAPIST

## 2021-12-30 NOTE — PROGRESS NOTES
Physical Therapy Daily Progress Note      Patient: Ariana Michel   : 1955  Diagnosis/ICD-10 Code:  Status post left hip replacement [Z96.642]   Problems Addressed this Visit     None      Visit Diagnoses     Status post left hip replacement    -  Primary    Pain, hip        Difficulty in walking          Diagnoses       Codes Comments    Status post left hip replacement    -  Primary ICD-10-CM: Z96.642  ICD-9-CM: V43.64     Pain, hip     ICD-10-CM: M25.559  ICD-9-CM: 719.45     Difficulty in walking     ICD-10-CM: R26.2  ICD-9-CM: 719.7          Referring practitioner: Alexis Hernandez*  Date of Initial Visit: Type: THERAPY  Noted: 2021  Today's Date: 2021    VISIT#: 5    Subjective Patient reports feeling a little swollen in L lateral hip today.       Objective held progressions due to increased soreness.     See Exercise, Manual, and Modality Logs for complete treatment.     Assessment/Plan Patient tolerated all performed therapeutic exercise with only reports of increased fatigue. Patient will continue to benefit from lower extremity strengthening for improved stability with daily functional activity.   Plan Goals: STG:  - Pt to perform L SLR x5 reps without difficulty in 3 weeks.  - Pt to report 50% ease with getting in/out of car in 3 weeks.MET  - Pt to be independent with HEP in 3 weeks MET.  LTG:  - Improve Oxford hip score to 25% or less impairment by discharge.  - Increase L hip flex strength to 4-/5 or better by discharge.  - Ambulation to be returned to PLOF with no limping by discharge.  - Pt to tolerate sleeping in bed all night by discharge.    Progress per Plan of Care and Progress strengthening /stabilization /functional activity         Timed:         Manual Therapy:         mins  44773;     Therapeutic Exercise:    20     mins  09999;     Neuromuscular Max:    10    mins  70673;    Therapeutic Activity:          mins  06194;     Gait Training:           mins  19690;      Ultrasound:          mins  86207;    Ionto                                   mins   16211  Self Care                    _____  mins   97835  Canalith Repos                   mins  49540    Un-Timed:  Electrical Stimulation:         mins  11988 ( );  Dry Needling          mins self-pay   Traction          mins 95174    Timed Treatment:   30   mins   Total Treatment:     30   mins    Jaya Michel PTA  [place] License [id number]  Physical Therapist Assistant

## 2022-01-03 ENCOUNTER — TREATMENT (OUTPATIENT)
Dept: PHYSICAL THERAPY | Facility: CLINIC | Age: 67
End: 2022-01-03

## 2022-01-03 DIAGNOSIS — Z96.642 STATUS POST LEFT HIP REPLACEMENT: Primary | ICD-10-CM

## 2022-01-03 PROCEDURE — 97530 THERAPEUTIC ACTIVITIES: CPT | Performed by: PHYSICAL THERAPIST

## 2022-01-03 PROCEDURE — 97112 NEUROMUSCULAR REEDUCATION: CPT | Performed by: PHYSICAL THERAPIST

## 2022-01-03 PROCEDURE — 97110 THERAPEUTIC EXERCISES: CPT | Performed by: PHYSICAL THERAPIST

## 2022-01-03 NOTE — PROGRESS NOTES
Physical Therapy Daily Progress Note      Patient: Ariana Michel   : 1955  Diagnosis/ICD-10 Code:  Status post left hip replacement [Z96.642]  Referring practitioner: Alexis Hernandez*.  Follow up 22  Date of Initial Visit: 1/3/2022  Today's Date: 1/3/2022  Patient seen for 6 sessions. POC 2x/wk x 12 weeks, exp 3/15/22  Insurance , exp. 22        VISIT#: 6      Subjective :  Discomfort L lateral hip, 2/10.  She is using single tip cane for gait consistently and feels good about balance.       Objective :      See Exercise, Manual, and Modality Logs for complete treatment. Progression as noted.       Patient Education:        Assessment/Plan:  Pt. Showing improvement in L hip muscle strength AEB ability to perform supine hip abduction and flexion with less assist.     Plan Goals: STG:  - Pt to perform L SLR x5 reps without difficulty in 3 weeks.  - Pt to report 50% ease with getting in/out of car in 3 weeks.MET  - Pt to be independent with HEP in 3 weeks MET.  LTG:  - Improve Oxford hip score to 25% or less impairment by discharge.  - Increase L hip flex strength to 4-/5 or better by discharge.  - Ambulation to be returned to PLOF with no limping by discharge.  - Pt to tolerate sleeping in bed all night by discharge.    Progress per Plan of Care            Timed:         Manual Therapy:         mins  46924;     Therapeutic Exercise:     20    mins  36762;     Neuromuscular Max:   15     mins  45251;    Therapeutic Activity:    10      mins  40533;     Gait Training:           mins  16966;     Ultrasound:          mins  53123;    Ionto                                   mins   56050  Self Care                            mins   18901  Aquatic                               mins 18465    Un-Timed:  Electrical Stimulation:         mins  84303 ( );  Traction          mins 54096      Timed Treatment:  45   mins   Total Treatment:     45   mins    Monae Oliva PTA  Physical Therapist  Assistant   [place] license:  #[id number]

## 2022-01-06 ENCOUNTER — TREATMENT (OUTPATIENT)
Dept: PHYSICAL THERAPY | Facility: CLINIC | Age: 67
End: 2022-01-06

## 2022-01-06 DIAGNOSIS — R26.2 DIFFICULTY IN WALKING: ICD-10-CM

## 2022-01-06 DIAGNOSIS — Z96.642 STATUS POST LEFT HIP REPLACEMENT: Primary | ICD-10-CM

## 2022-01-06 DIAGNOSIS — M25.559 PAIN, HIP: ICD-10-CM

## 2022-01-06 PROCEDURE — 97530 THERAPEUTIC ACTIVITIES: CPT | Performed by: PHYSICAL THERAPIST

## 2022-01-06 PROCEDURE — 97112 NEUROMUSCULAR REEDUCATION: CPT | Performed by: PHYSICAL THERAPIST

## 2022-01-06 PROCEDURE — 97110 THERAPEUTIC EXERCISES: CPT | Performed by: PHYSICAL THERAPIST

## 2022-01-06 NOTE — PROGRESS NOTES
Physical Therapy Daily Progress Note      Patient: Ariana Michel   : 1955  Diagnosis/ICD-10 Code:  Status post left hip replacement [Z96.642]   Problems Addressed this Visit     None      Visit Diagnoses     Status post left hip replacement    -  Primary    Pain, hip        Difficulty in walking          Diagnoses       Codes Comments    Status post left hip replacement    -  Primary ICD-10-CM: Z96.642  ICD-9-CM: V43.64     Pain, hip     ICD-10-CM: M25.559  ICD-9-CM: 719.45     Difficulty in walking     ICD-10-CM: R26.2  ICD-9-CM: 719.7          Referring practitioner: Alexis Hernandez*  Date of Initial Visit: Type: THERAPY  Noted: 2021  Today's Date: 2022    VISIT#: 7    Subjective Patient reports feeling discouraged about not being able to complete SLR. Overall hip feeling ok with minimal discomfort.       Objective     See Exercise, Manual, and Modality Logs for complete treatment.     Assessment/Plan Patient able to complete SLR to 5 inches off table with good form but fatigued quickly. Patient will continue to benefit from continued lower extremity strengthening for improved activity tolerance and stability with daily functional activity.   Plan Goals: STG:  - Pt to perform L SLR x5 reps without difficulty in 3 weeks.  - Pt to report 50% ease with getting in/out of car in 3 weeks.MET  - Pt to be independent with HEP in 3 weeks MET.  LTG:  - Improve Oxford hip score to 25% or less impairment by discharge.  - Increase L hip flex strength to 4-/5 or better by discharge.  - Ambulation to be returned to PLOF with no limping by discharge.  - Pt to tolerate sleeping in bed all night by discharge.    Progress per Plan of Care         Timed:         Manual Therapy:         mins  50313;     Therapeutic Exercise:    20     mins  65871;     Neuromuscular Max:    15    mins  14015;    Therapeutic Activity:     10     mins  68547;     Gait Training:           mins  37317;     Ultrasound:          mins   12150;    Ionto                                   mins   42712  Self Care                    _____  mins   62217  Canalith Repos                   mins  36745    Un-Timed:  Electrical Stimulation:         mins  80553 ( );  Dry Needling          mins self-pay   Traction          mins 77564    Timed Treatment:   45   mins   Total Treatment:     45   mins    Jaya Michel PTA  [place] License [id number]  Physical Therapist Assistant

## 2022-01-10 ENCOUNTER — TREATMENT (OUTPATIENT)
Dept: PHYSICAL THERAPY | Facility: CLINIC | Age: 67
End: 2022-01-10

## 2022-01-10 DIAGNOSIS — Z96.642 STATUS POST LEFT HIP REPLACEMENT: Primary | ICD-10-CM

## 2022-01-10 PROCEDURE — 97110 THERAPEUTIC EXERCISES: CPT | Performed by: PHYSICAL THERAPIST

## 2022-01-10 PROCEDURE — 97112 NEUROMUSCULAR REEDUCATION: CPT | Performed by: PHYSICAL THERAPIST

## 2022-01-10 PROCEDURE — 97140 MANUAL THERAPY 1/> REGIONS: CPT | Performed by: PHYSICAL THERAPIST

## 2022-01-10 NOTE — PROGRESS NOTES
Physical Therapy Daily Progress Note      Patient: Ariana Michel   : 1955  Diagnosis/ICD-10 Code:  Status post left hip replacement [Z96.642]  Referring practitioner: Alexis Hernandez*.  Follow up 22  Date of Initial Visit: 1/10/2022  Today's Date: 1/10/2022  Patient seen for 8 sessions. POC 2x/wk x 12 weeks, exp 3/15/22  Insurance , exp. 22        VISIT#: 8      Subjective :  No pain currently.  She is using STC consistently for gait.  Her appointment with Dr. Hernandez was rescheduled due to snow for 22.  She feels like 1 leg is shorter than the other.       Objective :      See Exercise, Manual, and Modality Logs for complete treatment. Added manual.     Leg length discrepancy with L LE being approximately 1/2 to 3/4 inch shorter than the R LE.      Patient Education:  Pt. Given information on heel lift for left LE from [facility].  Pt. Instructed in  scar tissue massage and given instructions.  See chart.     Patient Education  Scar Massage      Assessment/Plan:  Pt. With leg length discrepancy which is affecting gait.  She has tenderness/tight muscles in the areas of L incision, L ITB, L hip and L lower abdomen. She felt manual was beneficial and helped.     Plan Goals: STG:  - Pt to perform L SLR x5 reps without difficulty in 3 weeks.  - Pt to report 50% ease with getting in/out of car in 3 weeks.MET  - Pt to be independent with HEP in 3 weeks MET.  LTG:  - Improve Oxford hip score to 25% or less impairment by discharge.  - Increase L hip flex strength to 4-/5 or better by discharge.  - Ambulation to be returned to PLOF with no limping by discharge.  - Pt to tolerate sleeping in bed all night by discharge.    Progress per Plan of Care:  Monitor response, continue manual.             Timed:         Manual Therapy:  15      mins  18101;     Therapeutic Exercise:     20    mins  36147;     Neuromuscular Max:   10    mins  95935;    Therapeutic Activity:          mins  00915;     Gait  Training:           mins  01833;     Ultrasound:          mins  16608;    Ionto                                   mins   47821  Self Care                            mins   22836  Aquatic                               mins 51285    Un-Timed:  Electrical Stimulation:         mins  78965 ( );  Traction          mins 11435      Timed Treatment:  45   mins   Total Treatment:     45   mins    Monae Oliva PTA  Physical Therapist Assistant   [place] license:  #[id number]

## 2022-01-12 ENCOUNTER — TREATMENT (OUTPATIENT)
Dept: PHYSICAL THERAPY | Facility: CLINIC | Age: 67
End: 2022-01-12

## 2022-01-12 DIAGNOSIS — Z96.642 STATUS POST LEFT HIP REPLACEMENT: Primary | ICD-10-CM

## 2022-01-12 PROCEDURE — 97110 THERAPEUTIC EXERCISES: CPT | Performed by: PHYSICAL THERAPIST

## 2022-01-12 PROCEDURE — 97112 NEUROMUSCULAR REEDUCATION: CPT | Performed by: PHYSICAL THERAPIST

## 2022-01-12 PROCEDURE — 97140 MANUAL THERAPY 1/> REGIONS: CPT | Performed by: PHYSICAL THERAPIST

## 2022-01-12 NOTE — PROGRESS NOTES
Physical Therapy Daily Progress Note      Patient: Ariana Michel   : 1955  Diagnosis/ICD-10 Code:  Status post left hip replacement [Z96.642]  Referring practitioner: Aleixs Hernandez*.  Follow up 22  Date of Initial Visit: 2022  Today's Date: 2022  Patient seen for 9 sessions. POC 2x/wk x 12 weeks, exp 3/15/22  Insurance , exp. 22        VISIT#: 9      Subjective :  Muscles sore L lateral hip, 4/10.  She has been doing scar tissue massage and massage to muscles several times a day and is sore.  She will see Dr. Hernandez later today.  Pain now ranging from 0-4/10, much improved. Pain now ranging from 0-4/10 which in an improvement over 2/10-10/10 initially.       Objective :        OXFORD HIP SCORE:  65% ABILITY (31/48)    See Exercise, Manual, and Modality Logs for complete treatment.       Patient Education:  Pt. Instructed to perform muscle massage every other day and scar tissue massage daily. She was also instructed in how to use small ball for muscle massage.       Assessment/Plan:  Pt. Has shown good progress in the 9 visits she has attended thus far.  She is now able to perform bed mobility independent.  Progressed to single tip cane for gait and getting in and out of car independently.  Goals met as noted.     Plan Goals: STG:  - Pt to perform L SLR x5 reps without difficulty in 3 weeks.  - Pt to report 50% ease with getting in/out of car in 3 weeks.MET  - Pt to be independent with HEP in 3 weeks MET.  LTG:  - Improve Oxford hip score to 25% or less impairment by discharge.  - Increase L hip flex strength to 4-/5 or better by discharge.  - Ambulation to be returned to PLOF with no limping by discharge.  - Pt to tolerate sleeping in bed all night by discharge.    Progress per Plan of Care:  Monitor response, insurance authorization submitted.             Timed:         Manual Therapy:  15      mins  84035;     Therapeutic Exercise:     20    mins  40468;     Neuromuscular  Max:   10    mins  03479;    Therapeutic Activity:          mins  45446;     Gait Training:           mins  71964;     Ultrasound:          mins  23797;    Ionto                                   mins   29908  Self Care                            mins   73530  Aquatic                               mins 94245    Un-Timed:  Electrical Stimulation:         mins  24576 ( );  Traction          mins 54503      Timed Treatment:  45   mins   Total Treatment:     45   mins    Monae Oliva PTA  Physical Therapist Assistant   [place] license:  #[id number]

## 2022-01-31 ENCOUNTER — TREATMENT (OUTPATIENT)
Dept: PHYSICAL THERAPY | Facility: CLINIC | Age: 67
End: 2022-01-31

## 2022-01-31 DIAGNOSIS — Z96.642 STATUS POST LEFT HIP REPLACEMENT: Primary | ICD-10-CM

## 2022-01-31 PROCEDURE — 97110 THERAPEUTIC EXERCISES: CPT | Performed by: PHYSICAL THERAPIST

## 2022-01-31 PROCEDURE — 97112 NEUROMUSCULAR REEDUCATION: CPT | Performed by: PHYSICAL THERAPIST

## 2022-01-31 PROCEDURE — 97140 MANUAL THERAPY 1/> REGIONS: CPT | Performed by: PHYSICAL THERAPIST

## 2022-01-31 NOTE — PROGRESS NOTES
Physical Therapy Daily Progress Note      Patient: Ariana Michel   : 1955  Diagnosis/ICD-10 Code:  Status post left hip replacement [Z96.642]  Referring practitioner: Alexis Hernandez*.  Follow up 22  Date of Initial Visit: 2022  Today's Date: 2022  Patient seen for 10 sessions. POC 2x/wk x 12 weeks, exp 3/15/22  Insurance , exp. 22        VISIT#: 10      Subjective :  Pt. Denies pain currently. She states that she cannot get left leg up and rotated to trim her toenails.  She wants to be able to do that. Her R knee is hurting quite a bit and does limit some of her activity.  She also requests that due to high co-pays she would like to decrease PT to 1x/wk.       Objective :      See Exercise, Manual, and Modality Logs for complete treatment. Progression as noted.       Patient Education:  HEP progressed and issued.  See chart. Emphasis on importance of performing HEP consistently.     Exercises  Seated Hip External Rotation AROM - 2 x daily - 7 x weekly - 3 sets - 10 reps  Frogger - 2 x daily - 7 x weekly - 1 sets - 10 reps  Standing Hip Abduction with Counter Support - 2 x daily - 7 x weekly - 1 sets - 10 reps  Standing Hip Flexion with Counter Support - 2 x daily - 7 x weekly - 1 sets - 10 reps  Standing Hip Extension with Counter Support - 2 x daily - 7 x weekly - 1 sets - 10 reps  Standing Marching - 1 x daily - 7 x weekly - 1 sets - 10 reps      Assessment/Plan: Pt. Has had poor compliance with perform HEP.  After discussion, she reports she will perform more consistently, especially since she needs to decrease PT to 1x/wk.  She did well with progression.     Plan Goals: STG:  - Pt to perform L SLR x5 reps without difficulty in 3 weeks.MET  - Pt to report 50% ease with getting in/out of car in 3 weeks.MET  - Pt to be independent with HEP in 3 weeks MET.  LTG:  - Improve Oxford hip score to 25% or less impairment by discharge.  - Increase L hip flex strength to 4-/5 or better  by discharge.  - Ambulation to be returned to PLOF with no limping by discharge.  - Pt to tolerate sleeping in bed all night by discharge.    Progress per Plan of Care:  Pt. To decrease PT to 1x/wk.              Timed:         Manual Therapy:  10      mins  33452;     Therapeutic Exercise:     20    mins  13652;     Neuromuscular Max:   15    mins  57207;    Therapeutic Activity:          mins  81200;     Gait Training:           mins  76541;     Ultrasound:          mins  26741;    Ionto                                   mins   59154  Self Care                            mins   97984  Aquatic                               mins 67224    Un-Timed:  Electrical Stimulation:         mins  15794 ( );  Traction          mins 86276      Timed Treatment:  45   mins   Total Treatment:     45   mins    Monae Oliva PTA  Physical Therapist Assistant   [place] license:  #[id number]

## 2022-02-09 ENCOUNTER — TREATMENT (OUTPATIENT)
Dept: PHYSICAL THERAPY | Facility: CLINIC | Age: 67
End: 2022-02-09

## 2022-02-09 DIAGNOSIS — M25.559 PAIN, HIP: ICD-10-CM

## 2022-02-09 DIAGNOSIS — R26.2 DIFFICULTY IN WALKING: ICD-10-CM

## 2022-02-09 DIAGNOSIS — Z96.642 STATUS POST LEFT HIP REPLACEMENT: Primary | ICD-10-CM

## 2022-02-09 PROCEDURE — 97140 MANUAL THERAPY 1/> REGIONS: CPT | Performed by: PHYSICAL THERAPIST

## 2022-02-09 PROCEDURE — 97110 THERAPEUTIC EXERCISES: CPT | Performed by: PHYSICAL THERAPIST

## 2022-02-09 PROCEDURE — 97112 NEUROMUSCULAR REEDUCATION: CPT | Performed by: PHYSICAL THERAPIST

## 2022-02-09 NOTE — PROGRESS NOTES
Physical Therapy Daily Progress Note      Patient: Ariana Michel   : 1955  Diagnosis/ICD-10 Code:  Status post left hip replacement [Z96.642]   Problems Addressed this Visit     None      Visit Diagnoses     Status post left hip replacement    -  Primary    Pain, hip        Difficulty in walking          Diagnoses       Codes Comments    Status post left hip replacement    -  Primary ICD-10-CM: Z96.642  ICD-9-CM: V43.64     Pain, hip     ICD-10-CM: M25.559  ICD-9-CM: 719.45     Difficulty in walking     ICD-10-CM: R26.2  ICD-9-CM: 719.7         Referring practitioner: Alexis Hernandez*  Date of Initial Visit: Type: THERAPY  Noted: 2021  Today's Date: 2022    VISIT#: 11    Subjective : Pt reports her hip is feeling better. Having minimal pain overall. Wants to keep work on strengthening L hip. States she is sleeping all night in bed. States she wants to be able to trim her own toenails.  Sierra hip score = 39/48, indicating 81% ability and 19% impairment    Objective : tightness in L hip ext rot ROM. Responded well to butterfly stretch.  Instructed pt in standing L hip flexor stretch with R foot on chair. Pt demonstrated good understanding and reported feeling a good stretch.  L hip flex = 3+/5 to 4-/5, abd = 4-/5  Gait: decreased stance time on L with mild L trendelenburg.  See Exercise, Manual, and Modality Logs for complete treatment.     Assessment/Plan : Decreased pain at start of session. Continued weakness L hip. Pt is making good progress toward goals. Mild antalgic gait due to L hip weakness. Pt will benefit from continued PT services to progress L hip strength and ROM for return to PLOF.    STG:  - Pt to perform L SLR x5 reps without difficulty in 3 weeks. - MET  - Pt to report 50% ease with getting in/out of car in 3 weeks. - MET  - Pt to be independent with HEP in 3 weeks. - MET  LTG:  - Improve Oxford hip score to 25% or less impairment by discharge. - MET  - Increase L hip flex  strength to 4-/5 or better by discharge. - Progressing  - Ambulation to be returned to PLOF with no limping by discharge. - Progressing  - Pt to tolerate sleeping in bed all night by discharge. - MET    Progress per Plan of Care and Progress strengthening /stabilization /functional activity         Timed:         Manual Therapy:    10     mins  61897;     Therapeutic Exercise:    20     mins  30456;     Neuromuscular Max:    15    mins  07301;    Therapeutic Activity:          mins  11030;     Gait Training:           mins  58223;     Ultrasound:          mins  94356;    Ionto                                   mins   87342  Self Care                            mins   95735  Canalith Repos                   mins  92738    Un-Timed:  Electrical Stimulation:         mins  51827 ( );  Dry Needling          mins 80511/47521  Traction          mins 56983  Low Eval          Mins  85791  Mod Eval          Mins  31247  High Eval                            Mins  07975  Re-Eval                               mins  40076    Timed Treatment:   45   mins   Total Treatment:     45   mins    Lety Kwong, PT, CLT, Cert DN  Physical Therapist  [place] Lic # [id number]

## 2022-09-26 ENCOUNTER — DOCUMENTATION (OUTPATIENT)
Dept: PHYSICAL THERAPY | Facility: CLINIC | Age: 67
End: 2022-09-26

## 2022-09-26 DIAGNOSIS — M25.559 PAIN, HIP: ICD-10-CM

## 2022-09-26 DIAGNOSIS — Z96.642 STATUS POST LEFT HIP REPLACEMENT: Primary | ICD-10-CM

## 2022-09-26 DIAGNOSIS — R26.2 DIFFICULTY IN WALKING: ICD-10-CM

## 2022-09-26 NOTE — PROGRESS NOTES
Discharge Summary  Discharge Summary from Physical Therapy Report      Dates  PT visit: 12/16/2021 - 2/9/2022  Number of Visits: 11     Discharge Status of Patient: See Progress Note dated 2/9/2022    Goals: Partially Met    Discharge Plan: Continue with current home exercise program as instructed    Comments : Pt made good progress toward goals. She continued to have some mild L hip weakness but continuation of HEP should lead to strength gains.    Date of Discharge 9/26/2022        Lety Kwong, PT  Physical Therapist  [place] Lic# [id number]

## 2023-07-14 PROCEDURE — 97161 PT EVAL LOW COMPLEX 20 MIN: CPT

## 2023-07-14 NOTE — PLAN OF CARE
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 PMH includes L THR in 2021. Patient lives with spouse in [facility] with 3 step entry, no rails. Patient currently ambulating without a.d. however presents with wide base of support, + limp. AROM both knees 0-98 degrees. Patient reports occasional \"catch\" on L knee during gait and with sit to stand activities. Educated on HEP, use of a.d., icing and plan of care post op. Patient should do well with HH PT after procedure.      Anticipated Discharge Disposition (PT): home with home health  "

## 2023-07-14 NOTE — THERAPY EVALUATION
Patient Name: Ariana Michel  : 1955    MRN: [id number]                              Today's Date: 2023       Admit Date: (Not on file)    Visit Dx:     ICD-10-CM ICD-9-CM   1. S/P TKR (total knee replacement), left  Z96.652 V43.65     Patient Active Problem List   Diagnosis    Status post total replacement of hip     Past Medical History:   Diagnosis Date    Anxiety     Bilateral leg edema     COVID-19 2020    Crohn's disease     not active    Hypertension     Osteoarthritis     PONV (postoperative nausea and vomiting)     RLS (restless legs syndrome)      Past Surgical History:   Procedure Laterality Date    LAPAROSCOPIC CHOLECYSTECTOMY      TOTAL HIP ARTHROPLASTY Left 12/10/2021    Procedure: TOTAL HIP ARTHROPLASTY ANTERIOR;  Surgeon: Alexis Hernandez II, MD;  Location: [facility] MAIN OR;  Service: Orthopedics;  Laterality: Left;      General Information       Row Name 23 1328          Physical Therapy Time and Intention    Document Type evaluation  -CR     Mode of Treatment physical therapy  -CR       Row Name 23 1328          General Information    Patient Profile Reviewed yes  -CR     Prior Level of Function independent:;all household mobility;community mobility;ADL's;driving  -CR     Existing Precautions/Restrictions no known precautions/restrictions  -CR     Barriers to Rehab none identified  -CR       Row Name 23 1328          Living Environment    People in Home spouse  -CR       Row Name 23 1328          Home Main Entrance    Number of Stairs, Main Entrance three  -CR     Stair Railings, Main Entrance none  -CR       Row Name 23 1328          Stairs Within Home, Primary    Number of Stairs, Within Home, Primary none  -CR       Row Name 23 1328          Cognition    Orientation Status (Cognition) oriented x 4  -CR       Row Name 23 1328          Safety Issues, Functional Mobility    Impairments Affecting Function (Mobility) pain  -CR                User Key  (r) = Recorded By, (t) = Taken By, (c) = Cosigned By      Initials Name Provider Type    CR Reyes, Carmela, PT Physical Therapist                   Mobility       Row Name 07/14/23 1328          Sit-Stand Transfer    Sit-Stand Palmer (Transfers) modified independence  -CR       Row Name 07/14/23 1328          Gait/Stairs (Locomotion)    Palmer Level (Gait) modified independence  -CR     Deviations/Abnormal Patterns (Gait) base of support, wide;antalgic  -CR               User Key  (r) = Recorded By, (t) = Taken By, (c) = Cosigned By      Initials Name Provider Type    CR Reyes, Carmela, PT Physical Therapist                   Obj/Interventions       Row Name 07/14/23 1328          Range of Motion Comprehensive    Comment, General Range of Motion AROM B knees 0-98 deg  -CR       Row Name 07/14/23 1328          Strength Comprehensive (MMT)    Comment, General Manual Muscle Testing (MMT) Assessment L quads grossly 3+/5, RLE grossly 5/5  -CR       Row Name 07/14/23 1328          Balance    Balance Assessment sit to stand dynamic balance;standing static balance;standing dynamic balance  -CR     Sit to Stand Dynamic Balance modified independence  -CR     Static Standing Balance independent  -CR     Dynamic Standing Balance modified independence  -CR       Row Name 07/14/23 1328          Sensory Assessment (Somatosensory)    Sensory Assessment (Somatosensory) sensation intact  -CR               User Key  (r) = Recorded By, (t) = Taken By, (c) = Cosigned By      Initials Name Provider Type    CR Reyes, Carmela, PT Physical Therapist                   Goals/Plan       Row Name 07/14/23 1333          Gait Training Goal 1 (PT)    Activity/Assistive Device (Gait Training Goal 1, PT) gait (walking locomotion);assistive device use;diminish gait deviation;walker, rolling  -CR     Palmer Level (Gait Training Goal 1, PT) standby assist  -CR     Distance (Gait Training Goal 1, PT) 100  -CR     Time  "Frame (Gait Training Goal 1, PT) long term goal (LTG);1 week  -CR       Row Name 07/14/23 3565          Stairs Goal 1 (PT)    Activity/Assistive Device (Stairs Goal 1, PT) stairs, all skills  -CR     Mer[place] Level/Cues Needed (Stairs Goal 1, PT) standby assist  -CR     Number of Stairs (Stairs Goal 1, PT) 4  -CR     Time Frame (Stairs Goal 1, PT) long term goal (LTG);1 week  -CR       Row Name 07/14/23 1793          Patient Education Goal (PT)    Activity (Patient Education Goal, PT) HEP  -CR     [place]/Cues/Accuracy (Memory Goal 2, PT) verbalizes understanding  -CR     Progress/Outcome (Patient Education Goal, PT) goal met  -CR       Row Name 07/14/23 1587          Therapy Assessment/Plan (PT)    Planned Therapy Interventions (PT) gait training;home exercise program;patient/family education;ROM (range of motion);stair training;strengthening;bed mobility training;transfer training  -CR               User Key  (r) = Recorded By, (t) = Taken By, (c) = Cosigned By      Initials Name Provider Type    CR Reyes, Carmela, PT Physical Therapist                   Clinical Impression       Row Name 07/14/23 9673          Pain    Additional Documentation Pain Scale: FACES Pre/Post-Treatment (Group)  -CR       Row Name 07/14/23 1281          Pain Scale: FACES Pre/Post-Treatment    Pain: FACES Scale, Pretreatment 2-->hurts little bit  -CR     Posttreatment Pain Rating 6-->hurts even more  -CR     Pain Location - Side/Orientation Left  -CR     Pain Location - knee  -CR       Row Name 07/14/23 9579          Plan of Care Review    Plan of Care Reviewed With patient  -CR     Outcome Evaluation 66 y/o female wtih DJD to L knee scheduled for L TKR  on7/20/2023. PMH includes L THR in 2021. Patient lives with spouse in [facility] with 3 step entry, no rails. Patient currently ambulating without a.d. however presents with wide base of support, + limp. AROM both knees 0-98 degrees. Patient reports occasional \"catch\" on L knee during " gait and with sit to stand activities. Educated on HEP, use of a.d., icing and plan of care post op. Patient should do well with HH PT after procedure.  -CR       Row Name 07/14/23 1330          Therapy Assessment/Plan (PT)    Patient/Family Therapy Goals Statement (PT) no pain  -CR     Rehab Potential (PT) good, to achieve stated therapy goals  -CR     Criteria for Skilled Interventions Met (PT) yes;skilled treatment is necessary  -CR     Therapy Frequency (PT) daily  -CR     Predicted Duration of Therapy Intervention (PT) dc  -CR               User Key  (r) = Recorded By, (t) = Taken By, (c) = Cosigned By      Initials Name Provider Type    CR Reyes, Carmela, PT Physical Therapist                   Outcome Measures    No documentation.                                Physical Therapy Education       Title: PT OT SLP Therapies (In Progress)       Topic: Physical Therapy (In Progress)       Point: Mobility training (Not Started)       Learner Progress:  Not documented in this visit.              Point: Home exercise program (Done)       Learning Progress Summary             Patient Acceptance, E,D,H, VU by CR at 7/14/2023 1334                         Point: Body mechanics (Not Started)       Learner Progress:  Not documented in this visit.              Point: Precautions (Not Started)       Learner Progress:  Not documented in this visit.                              User Key       Initials Effective Dates Name Provider Type Discipline    CR 06/16/21 -  Reyes, Carmela, PT Physical Therapist PT                  PT Recommendation and Plan  Planned Therapy Interventions (PT): gait training, home exercise program, patient/family education, ROM (range of motion), stair training, strengthening, bed mobility training, transfer training  Plan of Care Reviewed With: patient  Outcome Evaluation: 66 y/o female wtih DJD to L knee scheduled for L TKR  on7/20/2023. PMH includes L THR in 2021. Patient lives with spouse in [facility] with  "3 step entry, no rails. Patient currently ambulating without a.d. however presents with wide base of support, + limp. AROM both knees 0-98 degrees. Patient reports occasional \"catch\" on L knee during gait and with sit to stand activities. Educated on HEP, use of a.d., icing and plan of care post op. Patient should do well with HH PT after procedure.     Time Calculation:    PT Charges       Row Name 07/14/23 1334             Time Calculation    Start Time 1114  -CR      Stop Time 1138  -CR      Time Calculation (min) 24 min  -CR      PT Received On 07/14/23  -CR      PT - Next Appointment 07/20/23  -CR         Time Calculation- PT    Total Timed Code Minutes- PT 0 minute(s)  -CR                User Key  (r) = Recorded By, (t) = Taken By, (c) = Cosigned By      Initials Name Provider Type    CR Reyes, Carmela, PT Physical Therapist                  Therapy Charges for Today       Code Description Service Date Service Provider Modifiers Qty    08193775703 HC PT EVAL LOW COMPLEXITY 4 7/14/2023 Reyes, Carmela, PT GP 1               PT Discharge Summary  Anticipated Discharge Disposition (PT): home with home health    Carmela Reyes, PT  7/14/2023    "

## 2023-07-20 ENCOUNTER — HOSPITAL ENCOUNTER (OUTPATIENT)
Facility: HOSPITAL | Age: 68
Discharge: HOME | End: 2023-07-21
Attending: ORTHOPAEDIC SURGERY | Admitting: ORTHOPAEDIC SURGERY
Payer: MEDICARE

## 2023-07-20 ENCOUNTER — APPOINTMENT (OUTPATIENT)
Dept: GENERAL RADIOLOGY | Facility: HOSPITAL | Age: 68
End: 2023-07-20
Payer: MEDICARE

## 2023-07-20 DIAGNOSIS — Z96.652 S/P TKR (TOTAL KNEE REPLACEMENT), LEFT: Primary | ICD-10-CM

## 2023-07-20 PROBLEM — Z96.659 STATUS POST KNEE REPLACEMENT: Status: ACTIVE | Noted: 2023-07-20

## 2023-07-20 LAB
DEPRECATED RDW RBC AUTO: 41.6 FL (ref 37–54)
ERYTHROCYTE [DISTWIDTH] IN BLOOD BY AUTOMATED COUNT: 13.4 % (ref 12.3–15.4)
HCT VFR BLD AUTO: 36.9 % (ref 34–46.6)
HGB BLD-MCNC: 12.1 G/DL (ref 12–15.9)
MCH RBC QN AUTO: 29.1 PG (ref 26.6–33)
MCHC RBC AUTO-ENTMCNC: 32.6 G/DL (ref 31.5–35.7)
MCV RBC AUTO: 89.1 FL (ref 79–97)
PLATELET # BLD AUTO: 259 10*3/MM3 (ref 140–450)
PMV BLD AUTO: 8.5 FL (ref 6–12)
RBC # BLD AUTO: 4.14 10*6/MM3 (ref 3.77–5.28)
WBC NRBC COR # BLD: 14.3 10*3/MM3 (ref 3.4–10.8)

## 2023-07-20 PROCEDURE — G0378 HOSPITAL OBSERVATION PER HR: HCPCS

## 2023-07-20 PROCEDURE — 25010000002 EPINEPHRINE 1 MG/ML SOLUTION: Performed by: ORTHOPAEDIC SURGERY

## 2023-07-20 PROCEDURE — C1776 JOINT DEVICE (IMPLANTABLE): HCPCS | Performed by: ORTHOPAEDIC SURGERY

## 2023-07-20 PROCEDURE — C1713 ANCHOR/SCREW BN/BN,TIS/BN: HCPCS | Performed by: ORTHOPAEDIC SURGERY

## 2023-07-20 PROCEDURE — 25010000002 CLONIDINE PER 1 MG: Performed by: ORTHOPAEDIC SURGERY

## 2023-07-20 PROCEDURE — 25010000002 CEFAZOLIN PER 500 MG: Performed by: ORTHOPAEDIC SURGERY

## 2023-07-20 PROCEDURE — 73560 X-RAY EXAM OF KNEE 1 OR 2: CPT

## 2023-07-20 PROCEDURE — 85027 COMPLETE CBC AUTOMATED: CPT | Performed by: ORTHOPAEDIC SURGERY

## 2023-07-20 PROCEDURE — 25010000002 KETOROLAC TROMETHAMINE PER 15 MG: Performed by: ORTHOPAEDIC SURGERY

## 2023-07-20 PROCEDURE — 25010000002 ROPIVACAINE PER 1 MG: Performed by: ORTHOPAEDIC SURGERY

## 2023-07-20 PROCEDURE — 80048 BASIC METABOLIC PNL TOTAL CA: CPT | Performed by: ORTHOPAEDIC SURGERY

## 2023-07-20 DEVICE — JOURNEY II BCS XLPE ARTICULAR                                    INSERT SIZE 3-4 LEFT 10MM
Type: IMPLANTABLE DEVICE | Site: KNEE | Status: FUNCTIONAL
Brand: JOURNEY

## 2023-07-20 DEVICE — DEV CONTRL TISS STRATAFIX SPIRAL MNCRYL UD 3/0 PLS 30CM: Type: IMPLANTABLE DEVICE | Site: KNEE | Status: FUNCTIONAL

## 2023-07-20 DEVICE — IMPLANTABLE DEVICE: Type: IMPLANTABLE DEVICE | Site: KNEE | Status: FUNCTIONAL

## 2023-07-20 DEVICE — JOURNEY II BCS FEMORAL OXINIUM                                    LEFT SIZE 5
Type: IMPLANTABLE DEVICE | Site: KNEE | Status: FUNCTIONAL
Brand: JOURNEY

## 2023-07-20 DEVICE — DEV WND/CLS CONTRL TISS STRATAFIX SYMM PDS PLS CTX 60CM VIL: Type: IMPLANTABLE DEVICE | Site: KNEE | Status: FUNCTIONAL

## 2023-07-20 DEVICE — JOURNEY TIBIAL BASEPLATE NONPOROUS                                    LEFT SIZE 3
Type: IMPLANTABLE DEVICE | Site: KNEE | Status: FUNCTIONAL
Brand: JOURNEY

## 2023-07-20 DEVICE — CMT BONE PALACOS R HI/VISC 1X40: Type: IMPLANTABLE DEVICE | Site: KNEE | Status: FUNCTIONAL

## 2023-07-20 DEVICE — JOURNEY 7.5 ROUND RESURF PAT 35MM STANDARD
Type: IMPLANTABLE DEVICE | Site: KNEE | Status: FUNCTIONAL
Brand: JOURNEY

## 2023-07-20 RX ORDER — OXYCODONE HYDROCHLORIDE 5 MG/1
5 TABLET ORAL EVERY 4 HOURS PRN
Status: DISCONTINUED | OUTPATIENT
Start: 2023-07-20 | End: 2023-07-21 | Stop reason: HOSPADM

## 2023-07-20 RX ORDER — ESCITALOPRAM OXALATE 10 MG/1
10 TABLET ORAL DAILY
Status: DISCONTINUED | OUTPATIENT
Start: 2023-07-21 | End: 2023-07-21 | Stop reason: HOSPADM

## 2023-07-20 RX ORDER — ONDANSETRON 4 MG/1
4 TABLET, FILM COATED ORAL EVERY 6 HOURS PRN
Status: DISCONTINUED | OUTPATIENT
Start: 2023-07-20 | End: 2023-07-21 | Stop reason: HOSPADM

## 2023-07-20 RX ORDER — FLUMAZENIL 0.1 MG/ML
0.2 INJECTION INTRAVENOUS AS NEEDED
Status: DISCONTINUED | OUTPATIENT
Start: 2023-07-20 | End: 2023-07-20 | Stop reason: HOSPADM

## 2023-07-20 RX ORDER — LOSARTAN POTASSIUM 25 MG/1
25 TABLET ORAL
Status: DISCONTINUED | OUTPATIENT
Start: 2023-07-21 | End: 2023-07-21 | Stop reason: HOSPADM

## 2023-07-20 RX ORDER — SODIUM CHLORIDE 0.9 % (FLUSH) 0.9 %
10 SYRINGE (ML) INJECTION EVERY 12 HOURS SCHEDULED
Status: DISCONTINUED | OUTPATIENT
Start: 2023-07-20 | End: 2023-07-20 | Stop reason: HOSPADM

## 2023-07-20 RX ORDER — DIPHENHYDRAMINE HYDROCHLORIDE 50 MG/ML
12.5 INJECTION INTRAMUSCULAR; INTRAVENOUS
Status: DISCONTINUED | OUTPATIENT
Start: 2023-07-20 | End: 2023-07-20 | Stop reason: HOSPADM

## 2023-07-20 RX ORDER — FUROSEMIDE 20 MG/1
20 TABLET ORAL DAILY
Status: DISCONTINUED | OUTPATIENT
Start: 2023-07-21 | End: 2023-07-21 | Stop reason: HOSPADM

## 2023-07-20 RX ORDER — ONDANSETRON 2 MG/ML
4 INJECTION INTRAMUSCULAR; INTRAVENOUS ONCE AS NEEDED
Status: DISCONTINUED | OUTPATIENT
Start: 2023-07-20 | End: 2023-07-20 | Stop reason: HOSPADM

## 2023-07-20 RX ORDER — DOCUSATE SODIUM 100 MG/1
100 CAPSULE, LIQUID FILLED ORAL 2 TIMES DAILY PRN
Status: DISCONTINUED | OUTPATIENT
Start: 2023-07-20 | End: 2023-07-21 | Stop reason: HOSPADM

## 2023-07-20 RX ORDER — PROCHLORPERAZINE EDISYLATE 5 MG/ML
10 INJECTION INTRAMUSCULAR; INTRAVENOUS ONCE AS NEEDED
Status: DISCONTINUED | OUTPATIENT
Start: 2023-07-20 | End: 2023-07-20 | Stop reason: HOSPADM

## 2023-07-20 RX ORDER — ONDANSETRON 2 MG/ML
4 INJECTION INTRAMUSCULAR; INTRAVENOUS EVERY 6 HOURS PRN
Status: DISCONTINUED | OUTPATIENT
Start: 2023-07-20 | End: 2023-07-21 | Stop reason: HOSPADM

## 2023-07-20 RX ORDER — SODIUM CHLORIDE 0.9 % (FLUSH) 0.9 %
10 SYRINGE (ML) INJECTION AS NEEDED
Status: DISCONTINUED | OUTPATIENT
Start: 2023-07-20 | End: 2023-07-20 | Stop reason: HOSPADM

## 2023-07-20 RX ORDER — PROMETHAZINE HYDROCHLORIDE 25 MG/1
25 TABLET ORAL ONCE AS NEEDED
Status: DISCONTINUED | OUTPATIENT
Start: 2023-07-20 | End: 2023-07-20 | Stop reason: HOSPADM

## 2023-07-20 RX ORDER — POTASSIUM CHLORIDE 750 MG/1
10 TABLET, FILM COATED, EXTENDED RELEASE ORAL DAILY
Status: DISCONTINUED | OUTPATIENT
Start: 2023-07-21 | End: 2023-07-21 | Stop reason: HOSPADM

## 2023-07-20 RX ORDER — CELECOXIB 200 MG/1
200 CAPSULE ORAL ONCE
Status: COMPLETED | OUTPATIENT
Start: 2023-07-20 | End: 2023-07-20

## 2023-07-20 RX ORDER — IPRATROPIUM BROMIDE AND ALBUTEROL SULFATE 2.5; .5 MG/3ML; MG/3ML
3 SOLUTION RESPIRATORY (INHALATION) ONCE AS NEEDED
Status: DISCONTINUED | OUTPATIENT
Start: 2023-07-20 | End: 2023-07-20 | Stop reason: HOSPADM

## 2023-07-20 RX ORDER — HYDRALAZINE HYDROCHLORIDE 20 MG/ML
5 INJECTION INTRAMUSCULAR; INTRAVENOUS
Status: DISCONTINUED | OUTPATIENT
Start: 2023-07-20 | End: 2023-07-20 | Stop reason: HOSPADM

## 2023-07-20 RX ORDER — PROMETHAZINE HYDROCHLORIDE 25 MG/1
25 SUPPOSITORY RECTAL ONCE AS NEEDED
Status: DISCONTINUED | OUTPATIENT
Start: 2023-07-20 | End: 2023-07-20 | Stop reason: HOSPADM

## 2023-07-20 RX ORDER — LABETALOL HYDROCHLORIDE 5 MG/ML
5 INJECTION, SOLUTION INTRAVENOUS
Status: DISCONTINUED | OUTPATIENT
Start: 2023-07-20 | End: 2023-07-20 | Stop reason: HOSPADM

## 2023-07-20 RX ORDER — EPHEDRINE SULFATE 5 MG/ML
5 INJECTION INTRAVENOUS ONCE AS NEEDED
Status: DISCONTINUED | OUTPATIENT
Start: 2023-07-20 | End: 2023-07-20 | Stop reason: HOSPADM

## 2023-07-20 RX ORDER — SODIUM CHLORIDE 9 MG/ML
100 INJECTION, SOLUTION INTRAVENOUS CONTINUOUS
Status: DISCONTINUED | OUTPATIENT
Start: 2023-07-20 | End: 2023-07-21 | Stop reason: HOSPADM

## 2023-07-20 RX ORDER — MELOXICAM 15 MG/1
15 TABLET ORAL DAILY
Status: DISCONTINUED | OUTPATIENT
Start: 2023-07-20 | End: 2023-07-20

## 2023-07-20 RX ORDER — OXYCODONE HYDROCHLORIDE 5 MG/1
10 TABLET ORAL EVERY 4 HOURS PRN
Status: DISCONTINUED | OUTPATIENT
Start: 2023-07-20 | End: 2023-07-21 | Stop reason: HOSPADM

## 2023-07-20 RX ORDER — NALOXONE HCL 0.4 MG/ML
0.4 VIAL (ML) INJECTION AS NEEDED
Status: DISCONTINUED | OUTPATIENT
Start: 2023-07-20 | End: 2023-07-20 | Stop reason: HOSPADM

## 2023-07-20 RX ORDER — NALOXONE HCL 0.4 MG/ML
0.1 VIAL (ML) INJECTION
Status: DISCONTINUED | OUTPATIENT
Start: 2023-07-20 | End: 2023-07-21 | Stop reason: HOSPADM

## 2023-07-20 RX ORDER — MELOXICAM 15 MG/1
15 TABLET ORAL DAILY
Status: DISCONTINUED | OUTPATIENT
Start: 2023-07-21 | End: 2023-07-21 | Stop reason: HOSPADM

## 2023-07-20 RX ORDER — OXYCODONE HCL 10 MG/1
10 TABLET, FILM COATED, EXTENDED RELEASE ORAL ONCE
Status: COMPLETED | OUTPATIENT
Start: 2023-07-20 | End: 2023-07-20

## 2023-07-20 RX ORDER — TRANEXAMIC ACID 10 MG/ML
1000 INJECTION, SOLUTION INTRAVENOUS ONCE
Status: DISCONTINUED | OUTPATIENT
Start: 2023-07-20 | End: 2023-07-20 | Stop reason: HOSPADM

## 2023-07-20 RX ORDER — FENTANYL CITRATE 50 UG/ML
50 INJECTION, SOLUTION INTRAMUSCULAR; INTRAVENOUS
Status: DISCONTINUED | OUTPATIENT
Start: 2023-07-20 | End: 2023-07-20 | Stop reason: HOSPADM

## 2023-07-20 RX ORDER — TRANEXAMIC ACID 10 MG/ML
1000 INJECTION, SOLUTION INTRAVENOUS ONCE
Status: COMPLETED | OUTPATIENT
Start: 2023-07-20 | End: 2023-07-20

## 2023-07-20 RX ORDER — ACETAMINOPHEN 500 MG
1000 TABLET ORAL ONCE
Status: COMPLETED | OUTPATIENT
Start: 2023-07-20 | End: 2023-07-20

## 2023-07-20 RX ORDER — ASPIRIN 81 MG/1
81 TABLET ORAL EVERY 12 HOURS SCHEDULED
Status: DISCONTINUED | OUTPATIENT
Start: 2023-07-21 | End: 2023-07-21 | Stop reason: HOSPADM

## 2023-07-20 RX ORDER — SODIUM CHLORIDE, SODIUM LACTATE, POTASSIUM CHLORIDE, CALCIUM CHLORIDE 600; 310; 30; 20 MG/100ML; MG/100ML; MG/100ML; MG/100ML
9 INJECTION, SOLUTION INTRAVENOUS CONTINUOUS PRN
Status: DISCONTINUED | OUTPATIENT
Start: 2023-07-20 | End: 2023-07-20 | Stop reason: HOSPADM

## 2023-07-20 RX ADMIN — OXYCODONE HYDROCHLORIDE 5 MG: 5 TABLET ORAL at 21:00

## 2023-07-20 RX ADMIN — CELECOXIB 200 MG: 200 CAPSULE ORAL at 11:40

## 2023-07-20 RX ADMIN — SODIUM CHLORIDE, POTASSIUM CHLORIDE, SODIUM LACTATE AND CALCIUM CHLORIDE 9 ML/HR: 600; 310; 30; 20 INJECTION, SOLUTION INTRAVENOUS at 11:40

## 2023-07-20 RX ADMIN — ACETAMINOPHEN 1000 MG: 500 TABLET, FILM COATED ORAL at 11:40

## 2023-07-20 RX ADMIN — OXYCODONE HYDROCHLORIDE 10 MG: 10 TABLET, FILM COATED, EXTENDED RELEASE ORAL at 11:40

## 2023-07-20 RX ADMIN — SODIUM CHLORIDE 100 ML/HR: 9 INJECTION, SOLUTION INTRAVENOUS at 17:32

## 2023-07-20 RX ADMIN — OXYCODONE HYDROCHLORIDE 5 MG: 5 TABLET ORAL at 16:58

## 2023-07-20 RX ADMIN — CEFAZOLIN 2000 MG: 2 INJECTION, POWDER, FOR SOLUTION INTRAMUSCULAR; INTRAVENOUS at 21:00

## 2023-07-20 NOTE — OP NOTE
ROBOTIC Total Knee Replacement Operative Note  Dr. CHELSEA Hernandez II  [phone]    PATIENT NAME: Ariana Michel  MRN: [id number]  : 1955 AGE: 67 y.o. GENDER: female  DATE OF OPERATION: 2023  PREOPERATIVE DIAGNOSIS: End Stage Arthritis  POSTOPERATIVE DIAGNOSIS: Same  OPERATION PERFORMED: Left Robotic Assisted Total Knee Arthroplasty  SURGEON: Alexis Hernandez MD  Circulator: Riog Sotelo RN  Scrub Person: Kaiden Marcelino  Vendor Representative: Karl Blanco  Assistant: Hugo Fowler CSA  ANESTHESIA: General  ASSISTANT:  Hugo Fowler. This case would not have been possible without another set of skilled surgical hands for retraction, bone reduction, use of instrumentation, assistance with implants, and closure.  This assistance was vital to the success of the case.   ESTIMATED BLOOD LOSS: 50cc  SPONGE AND NEEDLE COUNT: Correct  INDICATIONS:   A discussion of operative versus nonoperative treatment was had with the patient and they failed conservative management. They elected to undergo total knee arthroplasty. The risks of surgery were discussed and included the risk of anesthesia, infection, damage to neurovascular structures, implant loosening/failure, fracture, hardware prominence, continued pain, early failure, the need for further procedures, medical complications, and others. No guarantees were made. The patient wished to proceed with surgery and a surgical consent was signed.    COMPONENTS:   Journey II BCS Oxinium Femoral Component: Size 5  Journey II Tibial Baseplate: 3   Posterior Stabilized Insert:  10  Patella: 35mm      PERTINENT FINDINGS: Degenerative Arthritis    DETAILS OF PROCEDURE:  The patient was met in the preoperative area. The site was marked. The consent and H&P were reviewed. The patient was then wheeled back to the operative suite and transferred to the operative table. The patient underwent anesthesia. A tourniquet was placed on the upper thigh. Surgical alcohol was used to  thoroughly clean the entire operative extremity.     The leg was then prepped in the normal sterile fashion and surgical space suits were used for the entire operative team. New outer gloves were used by all sterile surgical team members after final draping. After a surgical timeout, the tourniquet was inflated.     I began by inserting 2 pins into the tibial and femoral shafts and attaching the tibial robotic .    In flexion, a midline knee incision was utilized centered on the patella and ending medial to the tibial tubercle. Dissection was carried down to the knee capsule. A medial parapatellar ararthrotomy was completed. The patellar fat pad was excised. The MCL was minimally elevated to gain adequate exposure to the knee.  The suprapatellar fat pad was also excised.  The patella was subluxed laterally. The patella was held vertical using 2 clamps, and was then cut using a saw. The patella was then sized, and the lug holes were drilled. Excess patellar bone was removed using a saw. The patella was then protected during this case using the metal patella shield.    The tibial and femoral guides were then attached to the pins.  I did utilize a femoral button but not a tibial button.    The ACL, meniscus, and PCL were then resected.  Next I proceeded with a standard mapping of the knee including all relevant anatomic positions, range of motion, and stressing of ligaments.  I then planned out the knee including implant sizes, rotation, and bony resection to appropriately balance the knee as needed.      After the mapping and planning was complete, I turned my attention to the distal femur.  Using the bur, I was able to remove the distal femoral bone and create the initial lug holes.  I then used the punch to create the pinholes for the 5-in-1 cutting block.  The 5-in-1 cutting block was then snapped into place and pinned.  I used a saw to resect the anterior posterior and chamfer cuts.  These were all removed  using a rongeur.    I then turned my attention to the tibia.  Retractors were placed appropriately.  Using the robot for guidance, a cutting jig was attached to the tibia using 2 pins.  This was done just above the level of measured resection.  I then used a saw to cut the tibia and remove this bone.  At that point, I was able to use the bur to resect down to the actual intended target tibial resection line.  This was verified to be accurate afterwards on the robot screen.    At that point, the remaining meniscus and osteophytes were removed.  I then attached the femoral component which was well-seated. The femoral BCS box was then prepared for.  I then trialed the knee and was noted to be in excellent balance with good range of motion.    We next turned our attention back to the tibia to finish the tibial preparation. The tibia was measured and sized. The tibial plate was aligned with the rotation from the trialing process and verified to be positioned near the medial third of the tibial tubercle. The tibial surface was then prepared for the keel.     The knee was thoroughly irrigated with sterile saline using a pulse-lavage system while the final tibial baseplate, femoral component and patellar component were opened. Cement was prepared and mixed using standard techniques. Outer gloves were changed before implant handling to ensure no soft tissue or oily material was exposed to the surfaces of the final implants. The bony knee surfaces were dried and the implants were cemented in place, starting with the tibia, then the femur and finally the patella. Excess cement was removed at each step. A trial poly was utilized during cementation for compression. The tourniquet was taken down and adequate hemostasis was achieved. The knee was thoroughly irrigated once again.     The soft tissues about the knee were then injected with an anesthetic cocktail. Care was taken to avoid the peroneal nerve and the neurovascular  "bundle posteriorly. The cement was allowed to harden.  While the cement was hardening, I did remove all 4 pins and the and femoral button.  The tibial and femoral pin sites were closed.    After the cement was fully set, the knee was ranged with various thickness of polyethylene trials to ensure that the balance was appropriate after robotic resection. The knee was inspected for excess cement, which was removed. The real poly, of corresponding thickness was then opened and inserted into the knee. One final range of motion and stability test showed the knee to be in good condition with a well tracking patellar component.    The knee capsule was then closed with a running barbed suture as well as interrupted Ethibond sutures. The knee was then closed in layers.  A sterile dressing was applied.    The patient was awoken from anesthesia, moved to the [facility] and taken to the recovery room in stable condition. Sponge and needle count were correct. There were no complications. Patient tolerated the procedure well.    R \"Abundio\" David SANTILLAN MD  Orthopaedic Surgery  [facility]  [phone]                "

## 2023-07-20 NOTE — H&P
Orthopaedic Surgery  History & Physical For Elective Total Knee  Dr. CHELSEA Hernandez II  [phone]    HPI:  Patient is a 67 y.o. Not  or  female who presents with End-stage arthritis of the left knee. They failed conservative treatment of their knee pain and a thorough discussion of the risks and benefits of surgery was had. The patient wishes to continue with elective total knee replacement, they were scheduled and are here for surgery. They did get medical clearance as well as a thorough preoperative workup.    MEDICAL HISTORY  Past Medical History:   Diagnosis Date    Anxiety     Bilateral leg edema     COVID-19 11/2020    Crohn's disease     not active    Hypertension     Osteoarthritis     PONV (postoperative nausea and vomiting)     RLS (restless legs syndrome)      Past Surgical History:   Procedure Laterality Date    LAPAROSCOPIC CHOLECYSTECTOMY  2014    TOTAL HIP ARTHROPLASTY Left 12/10/2021    Procedure: TOTAL HIP ARTHROPLASTY ANTERIOR;  Surgeon: Alexis Hernandez II, MD;  Location: [facility] MAIN OR;  Service: Orthopedics;  Laterality: Left;     Prior to Admission medications    Medication Sig Start Date End Date Taking? Authorizing Provider   acetaminophen (TYLENOL) 650 MG 8 hr tablet Take 1 tablet by mouth Every 8 (Eight) Hours As Needed for Mild Pain.    ProviderDalton MD   aspirin 81 MG EC tablet Take 1 tablet by mouth Every 12 (Twelve) Hours.  Patient not taking: Reported on 7/14/2023 12/11/21   Alexis Hernandez II, MD   escitalopram (LEXAPRO) 10 MG tablet Take 1 tablet by mouth Daily.    ProviderDalton MD   furosemide (LASIX) 20 MG tablet Take 1 tablet by mouth Daily.    ProviderDalton MD   ibuprofen (ADVIL,MOTRIN) 200 MG tablet Take 1 tablet by mouth Every 6 (Six) Hours As Needed for Mild Pain.    Dalton Cuello MD   irbesartan (AVAPRO) 75 MG tablet Take 1 tablet by mouth Daily.    Dlaton Cuello MD   magnesium oxide (MAG-OX) 400 MG  tablet Take 1 tablet by mouth Daily.    Dalton Cuello MD   montelukast (SINGULAIR) 10 MG tablet Take 1 tablet by mouth Every Night.    Dalton Cuello MD   multivitamin with minerals (CENTRUM ADULTS PO) Take 1 tablet by mouth Daily.    Dalton Cuello MD   nystatin (MYCOSTATIN) 342342 UNIT/GM cream Apply 1 application  topically to the appropriate area as directed As Needed.    Dalton Cuello MD   nystatin (MYCOSTATIN) 369107 UNIT/GM powder Apply 1 application  topically to the appropriate area as directed Daily As Needed.    Dalton Cuello MD   oxyCODONE-acetaminophen (PERCOCET) 5-325 MG per tablet Take 1 tablet by mouth Every 4 (Four) Hours As Needed for Severe Pain .  Patient taking differently: Take 1 tablet by mouth Every 4 (Four) Hours As Needed for Severe Pain. Not taking 12/11/21   Alexis Hernandez II, MD   potassium chloride (K-DUR,KLOR-CON) 10 MEQ CR tablet Take 1 tablet by mouth Daily.    Dalton Cuello MD   pramipexole (MIRAPEX) 0.25 MG tablet Take 1 tablet by mouth 3 (Three) Times a Day.    Dalton Cuello MD   Probiotic Product (Risaquad-2) capsule capsule Take 1 capsule by mouth Daily.    Dalton Cuello MD   psyllium (METAMUCIL) 58.6 % packet Take 1 packet by mouth Daily.    Dalton Cuello MD     No Known Allergies    There is no immunization history on file for this patient.  Social History     Tobacco Use    Smoking status: Never    Smokeless tobacco: Not on file   Substance Use Topics    Alcohol use: Not Currently      Social History     Substance and Sexual Activity   Drug Use Not Currently       REVIEW OF SYSTEMS:  Head: negative for headache  Respiratory: negative for shortness of breath.   Cardiovascular: negative for chest pain.   Gastrointestinal: negative abdominal pain.   Neurological: negative for LOC  Psychiatric/Behavioral: negative for memory loss.   All other systems reviewed and are negative    VITALS: There were no  vitals taken for this visit. There is no height or weight on file to calculate BMI.    PHYSICAL EXAM:   CONSTITUTIONAL: A&Ox3, No acute distress  LUNGS: Equal chest rise, no shortness of air  CARDIOVASCULAR: palpable peripheral pulses  SKIN: no skin lesions in the area examined  LYMPH: no lymphadenopathy in the area examined  EXTREMITY: Knee  Pulses:  Brisk Capillary Refill  Sensation: Intact to Saphenous, Sural, Deep Peroneal, Superficial Peroneal, and Tibial Nerves and grossly throughout extremity  Motor: 5/5 EHL/FHL/TA/GS motor complexes    RADIOLOGY REVIEW:   XR Chest PA & Lateral    Result Date: 7/14/2023  Impression: No acute chest finding. Electronically Signed: Saige Early  7/14/2023 10:37 AM EDT  Workstation ID: [id number]     LABS:   Results for the past 24 hours: No results found for this or any previous visit (from the past 24 hour(s)).    IMPRESSION:  Patient is a 67 y.o. Not  or  female with end-stage arthritis of the left knee    PLAN:   Surgery: Elective total knee arthroplasty  Consent: The risks and benefits of operative versus nonoperative treatment were discussed. The patient elected to undergo operative treatment of their knee arthritis. The risks discussed included but were not limited to blood clots, MI, stroke, other medical complications, infection, damage to neurovascular structures, continued pain, hardware prominence, loss of range of motion, need for further procedures, and and risk of anesthesia..  No guarantees were made   Disposition: Elective left Total Knee Arthroplasty today.    Alexis Hernandez II, MD  Orthopaedic Surgery  [facility]

## 2023-07-21 VITALS
HEIGHT: 65 IN | DIASTOLIC BLOOD PRESSURE: 70 MMHG | TEMPERATURE: 98.1 F | BODY MASS INDEX: 43.15 KG/M2 | WEIGHT: 259 LBS | SYSTOLIC BLOOD PRESSURE: 112 MMHG | OXYGEN SATURATION: 92 % | RESPIRATION RATE: 16 BRPM | HEART RATE: 63 BPM

## 2023-07-21 LAB
ANION GAP SERPL CALCULATED.3IONS-SCNC: 9 MMOL/L (ref 5–15)
BUN SERPL-MCNC: 13 MG/DL (ref 8–23)
BUN/CREAT SERPL: 17.3 (ref 7–25)
CALCIUM SPEC-SCNC: 8.7 MG/DL (ref 8.6–10.5)
CHLORIDE SERPL-SCNC: 102 MMOL/L (ref 98–107)
CO2 SERPL-SCNC: 26 MMOL/L (ref 22–29)
CREAT SERPL-MCNC: 0.75 MG/DL (ref 0.57–1)
EGFRCR SERPLBLD CKD-EPI 2021: 88.5 ML/MIN/1.73
GLUCOSE SERPL-MCNC: 150 MG/DL (ref 65–99)
POTASSIUM SERPL-SCNC: 4.3 MMOL/L (ref 3.5–5.2)
SODIUM SERPL-SCNC: 137 MMOL/L (ref 136–145)

## 2023-07-21 PROCEDURE — 97164 PT RE-EVAL EST PLAN CARE: CPT

## 2023-07-21 PROCEDURE — 25010000002 CEFAZOLIN PER 500 MG: Performed by: ORTHOPAEDIC SURGERY

## 2023-07-21 RX ORDER — PRAMIPEXOLE DIHYDROCHLORIDE 0.25 MG/1
0.25 TABLET ORAL EVERY 8 HOURS SCHEDULED
Status: DISCONTINUED | OUTPATIENT
Start: 2023-07-21 | End: 2023-07-21 | Stop reason: HOSPADM

## 2023-07-21 RX ORDER — ONDANSETRON 4 MG/1
4 TABLET, FILM COATED ORAL EVERY 6 HOURS PRN
Qty: 30 TABLET | Refills: 0 | Status: SHIPPED | OUTPATIENT
Start: 2023-07-21

## 2023-07-21 RX ORDER — OXYCODONE HYDROCHLORIDE AND ACETAMINOPHEN 5; 325 MG/1; MG/1
1 TABLET ORAL EVERY 4 HOURS PRN
Qty: 50 TABLET | Refills: 0 | Status: SHIPPED | OUTPATIENT
Start: 2023-07-21

## 2023-07-21 RX ORDER — ASPIRIN 81 MG/1
81 TABLET ORAL EVERY 12 HOURS
Qty: 60 TABLET | Refills: 0 | Status: SHIPPED | OUTPATIENT
Start: 2023-07-21 | End: 2023-08-20

## 2023-07-21 RX ADMIN — LOSARTAN POTASSIUM 25 MG: 25 TABLET, FILM COATED ORAL at 08:07

## 2023-07-21 RX ADMIN — OXYCODONE HYDROCHLORIDE 10 MG: 5 TABLET ORAL at 13:58

## 2023-07-21 RX ADMIN — ESCITALOPRAM OXALATE 10 MG: 10 TABLET ORAL at 08:07

## 2023-07-21 RX ADMIN — PRAMIPEXOLE DIHYDROCHLORIDE 0.25 MG: 0.25 TABLET ORAL at 08:07

## 2023-07-21 RX ADMIN — OXYCODONE HYDROCHLORIDE 5 MG: 5 TABLET ORAL at 05:28

## 2023-07-21 RX ADMIN — OXYCODONE HYDROCHLORIDE 5 MG: 5 TABLET ORAL at 01:27

## 2023-07-21 RX ADMIN — MELOXICAM 15 MG: 15 TABLET ORAL at 08:07

## 2023-07-21 RX ADMIN — OXYCODONE HYDROCHLORIDE 10 MG: 5 TABLET ORAL at 09:39

## 2023-07-21 RX ADMIN — POTASSIUM CHLORIDE 10 MEQ: 750 TABLET, EXTENDED RELEASE ORAL at 08:07

## 2023-07-21 RX ADMIN — FUROSEMIDE 20 MG: 20 TABLET ORAL at 08:07

## 2023-07-21 RX ADMIN — CEFAZOLIN 2000 MG: 2 INJECTION, POWDER, FOR SOLUTION INTRAMUSCULAR; INTRAVENOUS at 05:29

## 2023-07-21 RX ADMIN — ASPIRIN 81 MG: 81 TABLET, COATED ORAL at 08:07

## 2023-07-21 NOTE — DISCHARGE SUMMARY
Orthopaedic Discharge Summary  Dr. CHELSEA Strauss” [place] II  [phone]    NAME: Ariana Marilu PCP: Rafia Sanchez APRN   :  MRN: [id number]  [id number] LOS:  ADMIT: 0 days  2023   AGE/SEX: 67 y.o. female DC:  today             Admitting Diagnosis: Unilateral primary osteoarthritis, left knee [M17.12]  Status post knee replacement [Z96.659]    Surgery Performed: [place] ARTHRP KNE CONDYLE&PLATU MEDIAL&LAT COMPARTMENTS [09802] (TOTAL KNEE ARTHROPLASTY WITH CORI ROBOT)    Discharge Medications:         Discharge Medications        New Medications        Instructions Start Date   aspirin 81 MG EC tablet   81 mg, Oral, Every 12 Hours      ondansetron 4 MG tablet  Commonly known as: Zofran   4 mg, Oral, Every 6 Hours PRN      oxyCODONE-acetaminophen 5-325 MG per tablet  Commonly known as: PERCOCET   1 tablet, Oral, Every 4 Hours PRN             Continue These Medications        Instructions Start Date   acetaminophen 650 MG 8 hr tablet  Commonly known as: TYLENOL   650 mg, Oral, Every 8 Hours PRN      escitalopram 10 MG tablet  Commonly known as: LEXAPRO   10 mg, Oral, Daily      furosemide 20 MG tablet  Commonly known as: LASIX   20 mg, Oral, Daily      ibuprofen 800 MG tablet  Commonly known as: ADVIL,MOTRIN   800 mg, Oral, Every 6 Hours PRN      irbesartan 75 MG tablet  Commonly known as: AVAPRO   75 mg, Oral, Daily      magnesium oxide 400 MG tablet  Commonly known as: MAG-OX   400 mg, Oral, Daily      montelukast 10 MG tablet  Commonly known as: SINGULAIR   10 mg, Oral, Nightly      multivitamin with minerals tablet tablet   1 tablet, Oral, Daily      nystatin 297258 UNIT/GM cream  Commonly known as: MYCOSTATIN   1 application , Topical, As Needed      potassium chloride 10 MEQ CR tablet  Commonly known as: K-DUR,KLOR-CON   10 mEq, Oral, Daily      pramipexole 0.25 MG tablet  Commonly known as: MIRAPEX   0.25 mg, Oral, 3 Times Daily      psyllium 58.6 % packet  Commonly known as: METAMUCIL   1 packet, Oral, Daily       Risaquad-2 capsule capsule   1 capsule, Oral, Daily               Vitals:     Vitals:    07/20/23 1734 07/20/23 2008 07/21/23 0100 07/21/23 0444   BP: 107/73 110/69 120/68 113/61   BP Location: Left arm Left arm Left arm Left arm   Patient Position: Sitting Lying Lying Lying   Pulse: 85 84 84 70   Resp: 13 15 13 15   Temp: 97.9 °F (36.6 °C) 97.7 °F (36.5 °C) 97.7 °F (36.5 °C) 97.8 °F (36.6 °C)   TempSrc: Oral Oral Oral Oral   SpO2: 95% 95% 97% 97%   Weight:       Height:           Labs:      Admission on 07/20/2023   Component Date Value Ref Range Status    Glucose 07/20/2023 150 (H)  65 - 99 mg/dL Final    BUN 07/20/2023 13  8 - 23 mg/dL Final    Creatinine 07/20/2023 0.75  0.57 - 1.00 mg/dL Final    Sodium 07/20/2023 137  136 - 145 mmol/L Final    Potassium 07/20/2023 4.3  3.5 - 5.2 mmol/L Final    Chloride 07/20/2023 102  98 - 107 mmol/L Final    CO2 07/20/2023 26.0  22.0 - 29.0 mmol/L Final    Calcium 07/20/2023 8.7  8.6 - 10.5 mg/dL Final    BUN/Creatinine Ratio 07/20/2023 17.3  7.0 - 25.0 Final    Anion Gap 07/20/2023 9.0  5.0 - 15.0 mmol/L Final    eGFR 07/20/2023 88.5  >60.0 mL/min/1.73 Final    WBC 07/20/2023 14.30 (H)  3.40 - 10.80 10*3/mm3 Final    RBC 07/20/2023 4.14  3.77 - 5.28 10*6/mm3 Final    Hemoglobin 07/20/2023 12.1  12.0 - 15.9 g/dL Final    Hematocrit 07/20/2023 36.9  34.0 - 46.6 % Final    MCV 07/20/2023 89.1  79.0 - 97.0 fL Final    MCH 07/20/2023 29.1  26.6 - 33.0 pg Final    MCHC 07/20/2023 32.6  31.5 - 35.7 g/dL Final    RDW 07/20/2023 13.4  12.3 - 15.4 % Final    RDW-SD 07/20/2023 41.6  37.0 - 54.0 fl Final    MPV 07/20/2023 8.5  6.0 - 12.0 fL Final    Platelets 07/20/2023 259  140 - 450 10*3/mm3 Final        No results found.    Hospital Course:   67 y.o. female was admitted to [facility] to services of Alexis Hernandez II, MD with Unilateral primary osteoarthritis, left knee [M17.12]  Status post knee replacement [Z96.659] on 7/20/2023 and underwent [place] ARTHRP FERNANDO  "CONDYLE&PLATU MEDIAL&LAT COMPARTMENTS [57045] (TOTAL KNEE ARTHROPLASTY WITH CORI ROBOT). Post-operatively the patient transferred to the floor where the patient underwent mobilization therapy. Opioids were titrated to achieve appropriate pain management to allow for participation in mobilization exercises. Vital signs and laboratory values are now within safe parameters for discharge. The dressings and/or incision is intact without signs or symptoms of active infection. Operative extremity neurovascular status remains intact as compared to the preoperative exam. Appropriate education re: incision care, activity levels, medications, and follow up visits was completed and all questions were answered. The patient is now deemed stable for discharge.    HOME: The patient progressed well with physical therapy. There were cleared for discharge to home. The radhames was sent home in good condition}.       R \"Abundio\" David SANTILLAN MD  Orthopaedic Surgery  [facility]  [phone]                                               "

## 2023-07-21 NOTE — PLAN OF CARE
Goal Outcome Evaluation:  Plan of Care Reviewed With: patient        Progress: improving  Outcome Evaluation: Pt is a 66 y/o F admitted to [facility] on 7/20/23 with elective L TKA performed by Dr. Hernandez. She has end-stage arthritis of the knee and has failed conservative treatment, thus choosing to perform surgical intervention. Pt is currently living with spouse in [facility] with 3-step entry with no handrails to complete. At baseline, she is IND with household mobility, community ambulation, and ADLs without use of AD. Pt is currently completing transfers CGA, amb 100' CGA>SBA with RW, and completing 4 steps CGA>SBA with no hand rails. Pt shows no safety concerns or gait deficits with activity completion at this time. Pt was educated on completion of HEP, icing protocol, safe household mobility, and WBAT precaution. Pt was agreeable to all education provided and will be safe to d/c home with OPPT services once d/c.    Anticipated Discharge Disposition (PT): home with outpatient therapy services

## 2023-07-21 NOTE — CASE MANAGEMENT/SOCIAL WORK
Discharge Planning Assessment   [facility]ussain     Patient Name: Ariana Michel  MRN: [id number]  Today's Date: 7/21/2023    Admit Date: 7/20/2023    Plan: Pt is set up with [facility] outpt therapy per Kiera Setphaniedirk B[facility]                     Discharge Plan       Row Name 07/21/23 1027       Plan    Plan Pt is set up with [facility] outpt therapy per Kiera [facility] APRN                  Continued Care and Services - Admitted Since 7/20/2023    Coordination has not been started for this encounter.       Expected Discharge Date and Time       Expected Discharge Date Expected Discharge Time    Jul 21, 2023                                                 Katherine Escobar RN

## 2023-07-21 NOTE — PLAN OF CARE
Goal Outcome Evaluation:  Plan of Care Reviewed With: patient        Progress: improving  Outcome Evaluation: Pt pain being managed with po meds per MAR. Able to ambulate with a stand by assist and a walker. Truong dressing to the operative knee. Ice packs in place and I.S being used. Foot pumps in place. VSS and call light within reach. Plan ongoing.

## 2023-07-21 NOTE — DISCHARGE INSTRUCTIONS
Total Knee  Discharge Instructions  Dr. CHELSEA Strauss” [place] II  [phone]    INCISION CARE  Wash your hands prior to dressing changes  ENA Wound VAC: Postoperatively you had a ENA Wound Vac placed on the incision. This was placed under sterile conditions in the operating room. It remains in place for 7 days postoperatively. After 7 days, the entire dressing must be removed, including all of the sticky adhesive. The dressing and battery pack provide gentle suction to the incision and provide several benefits over a traditional dressing:  It maintains the sterile environment of the OR and reduces the risk of infection  The suction removes unwanted buildup of blood/hematoma under the skin to reduce swelling  The suction also promotes fresh blood supply to the skin and soft tissue to speed up healing  The postoperative scar is reduced in size  Showering is permitted immediately after surgery, but the battery pack must be protected or removed during the shower.   After 7 days the ENA Wound Vac is removed. If there is no drainage, no dressing is required. If there is some scant drainage a dry bandage can be applied and changed daily until seen in the office or until the drainage stops.   No creams or ointments to the incisions until 4 weeks post op.  Do not touch or pick at the incision  Check incision every day and notify surgeon immediately if any of the following signs or symptoms are seen:  Increase in redness  Increase in swelling around the incision and of the entire extremity  Increase in pain  NEW drainage or oozing from the incision  Pulling apart of the edges of the incision  Increase in overall body temperature (greater than 100.4 degrees)  Zip-Line: your incision was closed with a state of the art device.   Is a non-invasive and easy to use wound closure device that replaces sutures, staples and glue for surgical incisions  It minimizes scarring and eliminating “railroad” marks that come with staples or  sutures  It makes removal as atraumatic as peeling off a bandage  Can be removed at home or by a physical therapist or nurse at 14 days postoperatively    ACTIVITIES  Exercises:  Physical therapy will begin immediately while in the hospital. Patients going to a nursing home will get therapy as part of their care at the SNU/SNF facility. Patients going home may also have a therapist come to the house to help them mobilize until they can safely get to an outpatient therapy facility.  Elevate the affected leg most of the day during the first week post operatively. Caution must be taken to avoid pillow placement directly under the heel of the leg, as this can cause pressure ulcers even with a soft pillow. All pillows and blankets should be placed underneath of the thigh and calf so that the heel is free-floating.  Use cold packs for 20-30 minutes approximately 5 times per day.  You should perform the daily stretching and strengthening exercises as taught by the therapist as often as possible. This can be done many times a day.  Full weight bearing is allowed after surgery. It will be sore/painful to put weight on the leg, but this will help the bone to heal and prevent complications such as pneumonia, bed sores and blood clots. Mobilization is vital to the recovery process.  Activities of Daily Living:  No tub baths, hot tubs, or swimming pools for 4 weeks.  May shower and let water run over the incision immediately after surgery. The battery pack of the ENA Wound Vac must be protected or removed while in the shower. After the ENA is removed 7 days after surgery showering is permitted as long as there is no drainage from the wound.     Restrictions  Weight: It is ok to allow full weight bearing after surgery. Weight on the leg actually quickens the recovery process. While it will be sore/painful to put weight on the leg, it is safe to do so. Hip replacement after hip fracture has a much slower recover process. It can  take months to heal fully from a hip fracture and patients even make some slow benefits up to a year afterwards.   Driving: Many patients have questions about when it is safe to return to driving. The answer is that this is extremely variable. It depends on the extent of the surgery, as well as how quickly you heal. Certainly left leg surgeries make returning to driving easier while right leg surgeries require more extensive rehabilitation before driving can be safe. Until you can press down on the brake hard, and are off of all narcotics, driving is not permitted. Your surgeon cannot “clear” you to return to driving, only you can make the decision when you feel it is safe.    Medications  Anticoagulants: After upper extremity surgery most patients do not require an anticoagulant unless you have another injury that will be keeping you from mobilizing. Lower extremity surgery typically does require use of an anticoagulation medicine.   IF YOU HAD LOWER EXTREMITY SURGERY AND ARE NOT DISCHARGED HOME WITH ANY ANTICOAGULANT MEDICINE YOU SHOULD TAKE ASPIRIN 325mg DAILY FOR 30 DAYS POSTOPERATIVELY.  If you are discharged home with an anticoagulant such as Aspirin, Xarelto, Eliquis, Coumadin, or Lovenox, follow these simple instructions:   Notify surgeon immediately if any jaelyn bleeding is noted in the urine, stool, emesis, or from the nose or the incision. Blood in the stool will often appear as black rather than red. Blood in urine may appear as pink. Blood in emesis may appear as brown/black like coffee grounds.  You will need to apply pressure for longer periods of time to any cuts or abrasions to stop bleeding  Avoid alcohol while taking anticoagulants  Most anticoagulants are to be taken for 30 days postoperatively. After this time, you may stop using them unless instructed otherwise.   If you were already taking an anticoagulant (commonly Aspirin, Coumadin, or Plavix) you will likely be resuming your normal dose  postoperatively and will be continuing that medication at the discretion of the prescribing physician.  Stool Softeners: You will be at greater risk of constipation after surgery due to being less mobile and the pain medications.  Take stool softeners as needed. Over the counter Colace 100 mg 1-2 capsules twice daily can be taken.  If stools become too loose or too frequent, please decreases the dosage or stop the stool softener.  If constipation occurs despite use of stool softeners, you are to continue the stool softeners and add a laxative (Milk of Magnesia 1 ounce daily as needed)  Drink plenty of fluids, and eat fruits and vegetables during your recovery time. Getting up and mobilizing will help the bowels to recover their regular function, as will weaning off of all narcotics when the pain becomes tolerable.  Pain Medications: Utilized after surgery are narcotics. This is some general information about these medications.  CLASSIFICATION: Pain medications are called Opioids and are narcotics  LEGALITIES: It is illegal to share narcotics with others  DRIVING: it is illegal to drive while under the influence of narcotics. Doing so is a DUI.  POTENTIAL SIDE EFFECTS: nausea, vomiting, itching, dizziness, drowsiness, dry mouth, constipation, and difficulty urinating.  POTENTIAL ADVERSE EFFECTS:  Opioid tolerance can develop with use of pain medications and this simply means that it requires more and more of the medication to control pain. However, this is seen more in patients that use opioids for longer periods of time.  Opioid dependence can develop with use of Opioids. People with opioid dependence will experience withdrawal symptoms upon cessation of the medication.  Opioid addiction can develop with use of Opioids. The incidence of this is very unlikely in patients who take the medications as ordered and stop the medications as instructed.  Opioid overdose can be dangerous, but is unlikely when the medication  is taken as ordered and stopped when ordered. It is important not to mix opioids with alcohol as this can lead to over sedation and respiratory difficulty.  DOSAGE:  After the initial surgical pain begins to resolve, you may begin to decrease the pain medication. By the end of a few weeks, you should be off of pain medications.  Refills will not be given by the office during evening hours, on weekends, or after 6 weeks post-op. You are responsible for weaning off of pain medication. You can increase the time between narcotic pills, taking one every 4 then 6 then 8 hours and so on.  To seek refills on pain medications during the initial 6-week post-operative period, you must call the office to request the refill. The office will then notify you when to  the prescription. DO NOT wait until you are out of the medication to request a refill. Prescriptions will not be filled over the weekend and depending on the schedule, it may take a couple days for the prescription to be available. Someone will have to pick the prescription in person at the office.    FOLLOW-UP VISITS  You will need to follow up in the office with your surgeon in 3 weeks, or as instructed elsewhere in your discharge paperwork. Please call this number [phone] to schedule this appointment. If you are going to an SNF/SNU facility, they will arrange for you to follow up in the office.  If you have any concerns or suspected complications prior to your follow up visit, please call the office. Do not wait until your appointment time if you suspect complications. These will need to be addressed in the office promptly.      Alexis Hernandez II, MD  Orthopaedic Surgery  [facility]

## 2023-07-21 NOTE — THERAPY EVALUATION
Patient Name: Ariana Michel  : 1955    MRN: [id number]                              Today's Date: 2023       Admit Date: 2023    Visit Dx:     ICD-10-CM ICD-9-CM   1. S/P TKR (total knee replacement), left  Z96.652 V43.65     Patient Active Problem List   Diagnosis    Status post total replacement of hip    Status post knee replacement     Past Medical History:   Diagnosis Date    Anxiety     Bilateral leg edema     COVID-19 2020    Crohn's disease     not active    Hypertension     Osteoarthritis     PONV (postoperative nausea and vomiting)     RLS (restless legs syndrome)      Past Surgical History:   Procedure Laterality Date    COLONOSCOPY      LAPAROSCOPIC CHOLECYSTECTOMY      TOTAL HIP ARTHROPLASTY Left 12/10/2021    Procedure: TOTAL HIP ARTHROPLASTY ANTERIOR;  Surgeon: Alexis Hernandez II, MD;  Location: [facility] MAIN OR;  Service: Orthopedics;  Laterality: Left;    TOTAL KNEE ARTHROPLASTY Left 2023    Procedure: TOTAL KNEE ARTHROPLASTY WITH CORI ROBOT;  Surgeon: Alexis Hernandez II, MD;  Location: [facility] MAIN OR;  Service: Robotics - Ortho;  Laterality: Left;      General Information       Row Name 23 1613          Physical Therapy Time and Intention    Document Type re-evaluation  -MB     Mode of Treatment physical therapy  -MB       Row Name 23 1613          General Information    Patient Profile Reviewed yes  -MB     Prior Level of Function --  See initial eval for PLOF and Home Environment  -MB       Row Name 23 1613          Cognition    Orientation Status (Cognition) oriented x 4  -MB       Row Name 23 1613          Safety Issues, Functional Mobility    Impairments Affecting Function (Mobility) pain;range of motion (ROM)  -MB               User Key  (r) = Recorded By, (t) = Taken By, (c) = Cosigned By      Initials Name Provider Type    Federico Mead, PT Physical Therapist                   Mobility       Row Name 23 1614           Bed Mobility    Bed Mobility bed mobility (all) activities  -MB     All Activities, [place] (Bed Mobility) not tested  -MB     Comment, (Bed Mobility) Pt began and ended session in recliner  -MB       Row Name 07/21/23 1614          Bed-Chair Transfer    Bed-Chair [place] (Transfers) contact guard  -MB     Assistive Device (Bed-Chair Transfers) walker, front-wheeled  -MB       Row Name 07/21/23 1614          Sit-Stand Transfer    Sit-Stand [place] (Transfers) contact guard  -MB     Assistive Device (Sit-Stand Transfers) walker, front-wheeled  -MB       Row Name 07/21/23 1614          Gait/Stairs (Locomotion)    [place] Level (Gait) contact guard;standby assist  -MB     Assistive Device (Gait) walker, front-wheeled  -MB     Distance in Feet (Gait) 100  -MB     [place] Level (Stairs) contact guard;stand by assist  -MB     Handrail Location (Stairs) none  -MB     Number of Steps (Stairs) 3  -MB     Ascending Technique (Stairs) step-over-step  -MB     Descending Technique (Stairs) step-over-step  -MB     Comment, (Gait/Stairs) 100' CGA>SBA with RW and 4 steps CGA>SBA with no railings  -MB               User Key  (r) = Recorded By, (t) = Taken By, (c) = Cosigned By      Initials Name Provider Type    Federico Mead, PT Physical Therapist                   Obj/Interventions       Row Name 07/21/23 1616          Range of Motion Comprehensive    General Range of Motion no range of motion deficits identified  -MB       Row Name 07/21/23 1616          Strength Comprehensive (MMT)    General Manual Muscle Testing (MMT) Assessment no strength deficits identified  -MB       Row Name 07/21/23 1616          Balance    Balance Assessment sitting static balance;sitting dynamic balance;sit to stand dynamic balance;standing static balance;standing dynamic balance  -MB     Static Sitting Balance independent  -MB     Dynamic Sitting Balance independent  -MB     Position, Sitting Balance unsupported;sitting  in chair  -MB     Sit to Stand Dynamic Balance contact guard  -MB     Static Standing Balance standby assist  -MB     Dynamic Standing Balance contact guard  -MB     Position/Device Used, Standing Balance walker, rolling  -MB       Row Name 07/21/23 1616          Sensory Assessment (Somatosensory)    Sensory Assessment (Somatosensory) sensation intact  -MB               User Key  (r) = Recorded By, (t) = Taken By, (c) = Cosigned By      Initials Name Provider Type    MB Federico Barboza, PT Physical Therapist                   Goals/Plan       Row Name 07/21/23 1617          Bed Mobility Goal 1 (PT)    Activity/Assistive Device (Bed Mobility Goal 1, PT) bed mobility activities, all  -MB     [place] Level/Cues Needed (Bed Mobility Goal 1, PT) independent  -MB     Time Frame (Bed Mobility Goal 1, PT) long term goal (LTG);2 weeks  -MB       Row Name 07/21/23 1617          Transfer Goal 1 (PT)    Activity/Assistive Device (Transfer Goal 1, PT) transfers, all;walker, rolling  -MB     [place] Level/Cues Needed (Transfer Goal 1, PT) independent  -MB     Time Frame (Transfer Goal 1, PT) long term goal (LTG);2 weeks  -[facility] Name 07/21/23 1617          Gait Training Goal 1 (PT)    Activity/Assistive Device (Gait Training Goal 1, PT) gait (walking locomotion);walker, rolling  -MB     [place] Level (Gait Training Goal 1, PT) independent  -MB     Distance (Gait Training Goal 1, PT) 100  -MB     Time Frame (Gait Training Goal 1, PT) long term goal (LTG);2 weeks  -MB       Row Name 07/21/23 1617          Stairs Goal 1 (PT)    Activity/Assistive Device (Stairs Goal 1, PT) stairs, all skills  -MB     [place] Level/Cues Needed (Stairs Goal 1, PT) independent  -MB     Number of Stairs (Stairs Goal 1, PT) 4  -MB     Time Frame (Stairs Goal 1, PT) long term goal (LTG);2 weeks  -MB       Row Name 07/21/23 1617          Therapy Assessment/Plan (PT)    Planned Therapy Interventions (PT) balance training;bed mobility  training;gait training;home exercise program;neuromuscular re-education;patient/family education;stair training;strengthening;ROM (range of motion);transfer training  -MB               User Key  (r) = Recorded By, (t) = Taken By, (c) = Cosigned By      Initials Name Provider Type    Federico Mead, PT Physical Therapist                   Clinical Impression       Row Name 07/21/23 1617          Pain    Pretreatment Pain Rating 0/10 - no pain  -MB     Posttreatment Pain Rating 0/10 - no pain  -MB       Row Name 07/21/23 1624 07/21/23 1617       Plan of Care Review    Plan of Care Reviewed With -- patient  -MB    Progress -- improving  -MB    Outcome Evaluation Pt is a 68 y/o F admitted to [facility] on 7/20/23 with elective L TKA performed by Dr. Hernandez. She has end-stage arthritis of the knee and has failed conservative treatment, thus choosing to perform surgical intervention. Pt is currently living with spouse in [facility] with 3-step entry with no handrails to complete. At baseline, she is IND with household mobility, community ambulation, and ADLs without use of AD. Pt is currently completing transfers CGA, amb 100' CGA>SBA with RW, and completing 4 steps CGA>SBA with no hand rails. Pt shows no safety concerns or gait deficits with activity completion at this time. Pt was educated on completion of HEP, icing protocol, safe household mobility, and WBAT precaution. Pt was agreeable to all education provided and will be safe to d/c home with OPPT services once d/c.  -MB --      Row Name 07/21/23 1617          Therapy Assessment/Plan (PT)    Rehab Potential (PT) good, to achieve stated therapy goals  -MB     Criteria for Skilled Interventions Met (PT) yes  -MB     Therapy Frequency (PT) 2 times/day  -MB     Predicted Duration of Therapy Intervention (PT) until d/c  -MB       Row Name 07/21/23 1617          Vital Signs    O2 Delivery Pre Treatment room air  -MB     O2 Delivery Intra Treatment room air  -MB     O2 Delivery Post  Treatment room air  -MB     Pre Patient Position Sitting  -MB     Intra Patient Position Standing  -MB     Post Patient Position Sitting  -MB       Row Name 07/21/23 1617          Positioning and Restraints    Pre-Treatment Position sitting in chair/recliner  -MB     Post Treatment Position chair  -MB     In Chair notified nsg;sitting  -MB               User Key  (r) = Recorded By, (t) = Taken By, (c) = Cosigned By      Initials Name Provider Type    Federico Mead, STEPHANIE Physical Therapist                   Outcome Measures       Row Name 07/21/23 1618          How much help from another person do you currently need...    Turning from your back to your side while in flat bed without using bedrails? 4  -MB     Moving from lying on back to sitting on the side of a flat bed without bedrails? 4  -MB     Moving to and from a bed to a chair (including a wheelchair)? 4  -MB     Standing up from a chair using your arms (e.g., wheelchair, bedside chair)? 4  -MB     Climbing 3-5 steps with a railing? 4  -MB     To walk in hospital room? 4  -MB     AM-PAC 6 Clicks Score (PT) 24  -MB     Highest level of mobility 8 --> Walked 250 feet or more  -MB               User Key  (r) = Recorded By, (t) = Taken By, (c) = Cosigned By      Initials Name Provider Type    Federico Mead, STEPHANIE Physical Therapist                                 Physical Therapy Education       Title: PT OT SLP Therapies (Done)       Topic: Physical Therapy (Done)       Point: Mobility training (Done)       Learning Progress Summary             Patient Acceptance, E,TB, VU by MB at 7/21/2023 1618                         Point: Home exercise program (Done)       Learning Progress Summary             Patient Acceptance, E,TB, VU by MB at 7/21/2023 1618    Acceptance, E,D,H, VU by CR at 7/14/2023 1334                         Point: Body mechanics (Done)       Learning Progress Summary             Patient Acceptance, E,TB, VU by MB at 7/21/2023 1618                          Point: Precautions (Done)       Learning Progress Summary             Patient Acceptance, E,TB, VU by MB at 7/21/2023 8693                                         User Key       Initials Effective Dates Name Provider Type Discipline    CR 06/16/21 -  Reyes, Carmela, PT Physical Therapist PT    MB 06/06/23 -  Federico Barboza, STEPHANIE Physical Therapist PT                  PT Recommendation and Plan  Planned Therapy Interventions (PT): balance training, bed mobility training, gait training, home exercise program, neuromuscular re-education, patient/family education, stair training, strengthening, ROM (range of motion), transfer training  Plan of Care Reviewed With: patient  Progress: improving  Outcome Evaluation: Pt is a 68 y/o F admitted to [facility] on 7/20/23 with elective L TKA performed by Dr. Hernandez. She has end-stage arthritis of the knee and has failed conservative treatment, thus choosing to perform surgical intervention. Pt is currently living with spouse in [facility] with 3-step entry with no handrails to complete. At baseline, she is IND with household mobility, community ambulation, and ADLs without use of AD. Pt is currently completing transfers CGA, amb 100' CGA>SBA with RW, and completing 4 steps CGA>SBA with no hand rails. Pt shows no safety concerns or gait deficits with activity completion at this time. Pt was educated on completion of HEP, icing protocol, safe household mobility, and WBAT precaution. Pt was agreeable to all education provided and will be safe to d/c home with OPPT services once d/c.     Time Calculation:   PT Evaluation Complexity  History, PT Evaluation Complexity: no personal factors and/or comorbidities  Examination of Body Systems (PT Eval Complexity): 1-2 elements  Clinical Presentation (PT Evaluation Complexity): stable  Clinical Decision Making (PT Evaluation Complexity): low complexity  Overall Complexity (PT Evaluation Complexity): low complexity     PT Charges       Row Name 07/21/23  1618             Time Calculation    Start Time 1052  -MB      Stop Time 1120  -MB      Time Calculation (min) 28 min  -MB      PT Received On 07/21/23  -MB      PT - Next Appointment 07/22/23  -MB      PT Goal Re-Cert Due Date 08/04/23  -MB         Time Calculation- PT    Total Timed Code Minutes- PT 0 minute(s)  -MB                User Key  (r) = Recorded By, (t) = Taken By, (c) = Cosigned By      Initials Name Provider Type    Federico Meda, PT Physical Therapist                  Therapy Charges for Today       Code Description Service Date Service Provider Modifiers Qty    [id number] HC PT RE-EVAL ESTABLISHED PLAN 2 7/21/2023 Federico Barboza, PT GP 1            PT G-Codes  AM-PAC 6 Clicks Score (PT): 24  PT Discharge Summary  Anticipated Discharge Disposition (PT): home with outpatient therapy services    Federico Barboza PT  7/21/2023

## 2023-07-24 ENCOUNTER — TREATMENT (OUTPATIENT)
Dept: PHYSICAL THERAPY | Facility: CLINIC | Age: 68
End: 2023-07-24
Payer: MEDICARE

## 2023-07-24 DIAGNOSIS — Z96.652 S/P TOTAL KNEE ARTHROPLASTY, LEFT: ICD-10-CM

## 2023-07-24 DIAGNOSIS — M25.562 LEFT KNEE PAIN, UNSPECIFIED CHRONICITY: Primary | ICD-10-CM

## 2023-07-24 PROCEDURE — 97161 PT EVAL LOW COMPLEX 20 MIN: CPT | Performed by: PHYSICAL THERAPIST

## 2023-07-24 PROCEDURE — 97110 THERAPEUTIC EXERCISES: CPT | Performed by: PHYSICAL THERAPIST

## 2023-07-24 PROCEDURE — 97140 MANUAL THERAPY 1/> REGIONS: CPT | Performed by: PHYSICAL THERAPIST

## 2023-07-24 NOTE — CASE MANAGEMENT/SOCIAL WORK
Case Management Discharge Note      Final Note: Routine home with outpatient therapy           Transportation Services  Private: Car    Final Discharge Disposition Code: 01 - home or self-care

## 2023-07-24 NOTE — PROGRESS NOTES
Physical Therapy Initial Evaluation and Plan of Care  [address] [place] [address], [address], [place]  [address]    Patient: Ariana Michel   : 1955  Diagnosis/ICD-10 Code:  Left knee pain, unspecified chronicity [M25.562]  Referring practitioner: Alexis Hernandez*  Date of Initial Visit: 2023  Today's Date: 2023  Patient seen for 1 sessions           Subjective Questionnaire: Oxford knee score indicates 37% impairment      Subjective Evaluation    History of Present Illness  Mechanism of injury: Patient is a 68 y.o. WF who presents s/p L TKA on 23.  She is doing well with 2/10 pain which increases with walking or sitting prolonged. She has been using [employer] device 5 times per day and walking 100' 3 times per day and icing regularly.  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  She'd like to be back at work around the first  if possible.  Other goals include: increase mobility, and walking 20 min for exercise.  She is starting to wean from pain meds, didn't need it last night to sleep.  Has had some issues with constipation.    Patient Goals  Patient goals for therapy: decreased pain, decreased edema, increased strength, improved balance, increased motion, return to work and return to sport/leisure activities           Objective  Oxford knee score indicates 37% impairment with limitations in kneeling, stability, sleeping, walking.  Ambulates with rollator with antalgic gait.  Able to tolerate walking 100' before needing to sit.  Knee is bandaged with drain in place, ace bandage from distal calf to above knee.  Significant swelling noted L ankle.  Instructed patient to elevate leg with ice getting the ankle above knee above heart.  AROM flexion in sitting 75 deg flexion L (107 deg R); extension = -10 deg L (-4 deg R).  MMT deferred L LE, 4+/5 throughout R LE.  Quad tone: poor.  Instructed in quad set, is able to perform partial SLR with L if R knee is bent.         Assessment & Plan     Assessment  Impairments: abnormal gait, abnormal or restricted ROM, activity intolerance, impaired balance, impaired physical strength, lacks appropriate home exercise program, pain with function and weight-bearing intolerance  Functional Limitations: sleeping, walking, uncomfortable because of pain, standing and stooping  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  Increase L knee flexion to 120 deg in 12 weeks to allow normal sit to stand transfer  Perform 10 reps of repeated sit to stand without arms in 30 seconds in 12 weeks   Able to walk for exercise 20 min in 12 weeks      Plan  Therapy options: will be seen for skilled therapy services  Planned modality interventions: cryotherapy  Other planned modality interventions: physical modalities as needed  Planned therapy interventions: manual therapy, neuromuscular re-education, postural training, strengthening, stretching, therapeutic activities, home exercise program, functional ROM exercises, flexibility, gait training and balance/weight-bearing training  Frequency: 2x week  Duration in weeks: 12  Treatment plan discussed with: patient      Timed:         Manual Therapy:    15     mins  01621;     Therapeutic Exercise:    15     mins  03753;     Neuromuscular Max:        mins  25500;    Therapeutic Activity:          mins  31963;     Gait Training:           mins  19112;     Ultrasound:          mins  70490;    Self-care  ____ mins 41289    Un-Timed:  Electrical  Stimulation:         mins  99960 ( );  Dry Needling          mins self-pay 02628  Traction          mins 26722  Low Eval     30     Mins  60161  Mod Eval          Mins  51160  Canal repositioning _____ mins  51118        Timed Treatment:   30   mins   Total Treatment:     60   mins    PT SIGNATURE: Robin A Sprigler, PT   [place] license # [id number]  Electronically signed by Robin A Sprigler, PT, 07/24/23, 7:11 AM EDT    Initial Certification  Certification Period: 7/24/2023 through 10/21/2023  I certify that the therapy services are furnished while this patient is under my care.  The services outlined above are required by this patient, and will be reviewed every 90 days.     PHYSICIAN: Alexis Hernandez II, MD ______________________________________________________________________________________________     DATE: _________________________________________________________________________________________________________________________________    Please sign and return via fax to [phone]. Thank you, [facility].

## 2023-07-27 ENCOUNTER — TREATMENT (OUTPATIENT)
Dept: PHYSICAL THERAPY | Facility: CLINIC | Age: 68
End: 2023-07-27
Payer: MEDICARE

## 2023-07-27 DIAGNOSIS — Z96.652 S/P TOTAL KNEE ARTHROPLASTY, LEFT: ICD-10-CM

## 2023-07-27 DIAGNOSIS — M25.562 LEFT KNEE PAIN, UNSPECIFIED CHRONICITY: Primary | ICD-10-CM

## 2023-07-27 PROCEDURE — 97530 THERAPEUTIC ACTIVITIES: CPT | Performed by: PHYSICAL THERAPIST

## 2023-07-27 PROCEDURE — 97140 MANUAL THERAPY 1/> REGIONS: CPT | Performed by: PHYSICAL THERAPIST

## 2023-07-27 PROCEDURE — 97110 THERAPEUTIC EXERCISES: CPT | Performed by: PHYSICAL THERAPIST

## 2023-07-27 NOTE — PROGRESS NOTES
Physical Therapy Daily Treatment Note      Patient: Ariana Michel   : 1955  Diagnosis/ICD-10 Code:  Left knee pain, unspecified chronicity [M25.562]   Problems Addressed this Visit    None  Visit Diagnoses       Left knee pain, unspecified chronicity    -  Primary    S/P total knee arthroplasty, left              Diagnoses         Codes Comments    Left knee pain, unspecified chronicity    -  Primary ICD-10-CM: M25.562  ICD-9-CM: 719.46     S/P total knee arthroplasty, left     ICD-10-CM: Z96.652  ICD-9-CM: V43.65            Referring practitioner: Alexis Hernandez*  Date of Initial Visit: Type: THERAPY  Noted: 2023  Today's Date: 2023    VISIT#: 2    Subjective Patient reports knee is feeling little to no pain but has noticeable swelling.       Objective     See Exercise, Manual, and Modality Logs for complete treatment.     Assessment/Plan Patient with good early passive motion with soft end feel. Patient demonstrated good tolerance to progressed therapeutic exercise well with no reports of increased symptoms.   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  Increase L knee flexion to 120 deg in 12 weeks to allow normal sit to stand transfer  Perform 10 reps of repeated sit to stand without arms in 30 seconds in 12 weeks   Able to walk for exercise 20 min in 12 weeks    Progress per Plan of Care and Progress strengthening /stabilization /functional activity         Timed:         Manual Therapy:    15     mins  45850;      Therapeutic Exercise:    15     mins  00537;     Neuromuscular Max:        mins  97891;    Therapeutic Activity:     10     mins  28892;     Gait Training:           mins  66404;     Ultrasound:          mins  11995;    Ionto                                  mins   45505  Self Care                    _____  mins   45544  Canalith Repos                   mins  34657    Un-Timed:  Electrical Stimulation:         mins  22954 ( );  Dry Needling          mins self-pay   Traction          mins 53300    Timed Treatment:   40   mins   Total Treatment:     40   mins    Jaya Michel PTA  [place] License [id number]  Physical Therapist Assistant

## 2023-07-31 ENCOUNTER — TREATMENT (OUTPATIENT)
Dept: PHYSICAL THERAPY | Facility: CLINIC | Age: 68
End: 2023-07-31
Payer: MEDICARE

## 2023-07-31 DIAGNOSIS — Z96.652 S/P TOTAL KNEE ARTHROPLASTY, LEFT: ICD-10-CM

## 2023-07-31 DIAGNOSIS — M25.562 LEFT KNEE PAIN, UNSPECIFIED CHRONICITY: Primary | ICD-10-CM

## 2023-07-31 PROCEDURE — 97110 THERAPEUTIC EXERCISES: CPT | Performed by: PHYSICAL THERAPIST

## 2023-07-31 PROCEDURE — 97140 MANUAL THERAPY 1/> REGIONS: CPT | Performed by: PHYSICAL THERAPIST

## 2023-07-31 PROCEDURE — 97530 THERAPEUTIC ACTIVITIES: CPT | Performed by: PHYSICAL THERAPIST

## 2023-08-03 ENCOUNTER — TREATMENT (OUTPATIENT)
Dept: PHYSICAL THERAPY | Facility: CLINIC | Age: 68
End: 2023-08-03
Payer: MEDICARE

## 2023-08-03 DIAGNOSIS — M25.562 LEFT KNEE PAIN, UNSPECIFIED CHRONICITY: Primary | ICD-10-CM

## 2023-08-03 DIAGNOSIS — Z96.652 S/P TOTAL KNEE ARTHROPLASTY, LEFT: ICD-10-CM

## 2023-08-03 PROCEDURE — 97110 THERAPEUTIC EXERCISES: CPT | Performed by: PHYSICAL THERAPIST

## 2023-08-03 PROCEDURE — 97530 THERAPEUTIC ACTIVITIES: CPT | Performed by: PHYSICAL THERAPIST

## 2023-08-03 PROCEDURE — 97140 MANUAL THERAPY 1/> REGIONS: CPT | Performed by: PHYSICAL THERAPIST

## 2023-08-07 ENCOUNTER — TREATMENT (OUTPATIENT)
Dept: PHYSICAL THERAPY | Facility: CLINIC | Age: 68
End: 2023-08-07
Payer: MEDICARE

## 2023-08-07 DIAGNOSIS — M25.562 LEFT KNEE PAIN, UNSPECIFIED CHRONICITY: Primary | ICD-10-CM

## 2023-08-07 DIAGNOSIS — Z96.652 S/P TOTAL KNEE ARTHROPLASTY, LEFT: ICD-10-CM

## 2023-08-07 PROCEDURE — 97110 THERAPEUTIC EXERCISES: CPT | Performed by: PHYSICAL THERAPIST

## 2023-08-07 PROCEDURE — 97530 THERAPEUTIC ACTIVITIES: CPT | Performed by: PHYSICAL THERAPIST

## 2023-08-07 PROCEDURE — 97140 MANUAL THERAPY 1/> REGIONS: CPT | Performed by: PHYSICAL THERAPIST

## 2023-08-10 ENCOUNTER — TREATMENT (OUTPATIENT)
Dept: PHYSICAL THERAPY | Facility: CLINIC | Age: 68
End: 2023-08-10
Payer: MEDICARE

## 2023-08-10 DIAGNOSIS — M25.562 LEFT KNEE PAIN, UNSPECIFIED CHRONICITY: Primary | ICD-10-CM

## 2023-08-10 DIAGNOSIS — Z96.652 S/P TOTAL KNEE ARTHROPLASTY, LEFT: ICD-10-CM

## 2023-08-10 PROCEDURE — 97530 THERAPEUTIC ACTIVITIES: CPT | Performed by: PHYSICAL THERAPIST

## 2023-08-10 PROCEDURE — 97110 THERAPEUTIC EXERCISES: CPT | Performed by: PHYSICAL THERAPIST

## 2023-08-10 PROCEDURE — 97140 MANUAL THERAPY 1/> REGIONS: CPT | Performed by: PHYSICAL THERAPIST

## 2023-08-10 NOTE — LETTER
Physical Therapy Progress Note / Reassessment  [address] [facility], [address], [place]  [address]     Patient: Ariana Michel   : 1955  Diagnosis/ICD-10 Code:  Left knee pain, unspecified chronicity [M25.562]  Referring practitioner: Alexis Hernandez*  Date of Initial Evaluation:  Type: THERAPY  Noted: 2023  Patient seen for 6 sessions        Subjective:   Visit Diagnoses:  Visit Diagnosis       ICD-10-CM ICD-9-CM   1. Left knee pain, unspecified chronicity  M25.562 719.46   2. S/P total knee arthroplasty, left  Z96.652 V43.65               Ariana Michel reports feeling better, she removed her dressing and stitches and feeling much better, pleased with how it looks.  Still having swelling L leg.  Subjective Questionnaire: Oxford knee indicates 19% impairment, improved from 37% initially  Clinical Progress: improved  Home Program Compliance: Yes  Treatment has included: therapeutic exercise, neuromuscular re-education, manual therapy, therapeutic activity, gait training, and cryotherapy        Subjective Patient reports swelling still in L leg despite elevating and ice.  Non-operative knee is more of a functional barrier than R.  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        Objective Ambulates without AD and normal gait pattern.  AAROM L knee flexion = 111 deg, compared to 124 deg R; extension = -3 deg L compared to -5 deg R. SLS = 15 sec L, 15 sec R. Pitting edema noted L ankle today. Midpatellar girth: L = 51.0 cm, R = 46.0 cm (difference of 6 cm).  Malleolar girth: L = 48.0 cm, R = 46.0 cm (difference of 2 cm).  Able to perform repeated sit to stand on elevated surface. Encouraged patient to begin longer walks at home outside. Still needs assistance donning sock, able to don/doff shoe independently. Resumed ice today with elevation 10 min. Initial Vernon knee score indicated 19% today, improved from 37% initially, will send MD note for 23 appointment.            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     PT Signature: Robin A Sprigler, PT  [place] License: [id number]

## 2023-08-10 NOTE — PROGRESS NOTES
Physical Therapy Progress Note / Reassessment  [address] [facility], [address], [place]  [address]    Patient: Ariana Michel   : 1955  Diagnosis/ICD-10 Code:  Left knee pain, unspecified chronicity [M25.562]  Referring practitioner: Alexis Hernandez*  Date of Initial Evaluation:  Type: THERAPY  Noted: 2023  Patient seen for 6 sessions      Subjective:   Visit Diagnoses:    ICD-10-CM ICD-9-CM   1. Left knee pain, unspecified chronicity  M25.562 719.46   2. S/P total knee arthroplasty, left  Z96.652 V43.65         Ariana Michel reports feeling better, she removed her dressing and stitches and feeling much better, pleased with how it looks.  Still having swelling L leg.  Subjective Questionnaire: [place] knee indicates 19% impairment, improved from 37% initially  Clinical Progress: improved  Home Program Compliance: Yes  Treatment has included: therapeutic exercise, neuromuscular re-education, manual therapy, therapeutic activity, gait training, and cryotherapy      Subjective Patient reports swelling still in L leg despite elevating and ice.  Non-operative knee is more of a functional barrier than R.  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      Objective Ambulates without AD and normal gait pattern.  AAROM L knee flexion = 111 deg, compared to 124 deg R; extension = -3 deg L compared to -5 deg R. SLS = 15 sec L, 15 sec R. Pitting edema noted L ankle today. Midpatellar girth: L = 51.0 cm, R = 46.0 cm (difference of 6 cm).  Malleolar girth: L = 48.0 cm, R = 46.0 cm (difference of 2 cm).  Able to perform repeated sit to stand on elevated surface. Encouraged patient to begin longer walks at home outside. Still needs assistance donning sock, able to don/doff shoe independently. Resumed ice today with elevation 10 min. Initial [place] knee score indicated 19% today, improved from 37% initially, will send MD note for 23 appointment.         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  Increase L knee flexion to 120 deg in 12 weeks to allow normal sit to stand transfer - NOT MET  Perform 10 reps of repeated sit to stand without arms in 30 seconds in 12 weeks - NOT MET  Able to walk for exercise 20 min in 12 weeks - NOT MET     Recommendations: Continue as planned  Timeframe: 2 months  Prognosis to achieve goals: good      Timed:         Manual Therapy:    15     mins  23669;     Therapeutic Exercise:    15     mins  19246;     Neuromuscular Max:        mins  79277;    Therapeutic Activity:    10      mins  41964;     Gait Training:           mins  23389;     Ultrasound:          mins  59054;    Ionto                                   mins   19581  Self Care                           mins   29116    Un-Timed:  Electrical Stimulation:         mins  65191 ( );  Dry Needling          mins self-pay  Traction          mins 59495  Canalith Repos         mins 10365    Timed Treatment:   40   mins   Total Treatment:     40   mins    PT Signature: Robin A Sprigler, PT  [place] License: [id number]

## 2023-08-14 ENCOUNTER — TREATMENT (OUTPATIENT)
Dept: PHYSICAL THERAPY | Facility: CLINIC | Age: 68
End: 2023-08-14
Payer: MEDICARE

## 2023-08-14 DIAGNOSIS — Z96.652 S/P TOTAL KNEE ARTHROPLASTY, LEFT: ICD-10-CM

## 2023-08-14 DIAGNOSIS — M25.562 LEFT KNEE PAIN, UNSPECIFIED CHRONICITY: Primary | ICD-10-CM

## 2023-08-14 PROCEDURE — 97110 THERAPEUTIC EXERCISES: CPT | Performed by: PHYSICAL THERAPIST

## 2023-08-14 PROCEDURE — 97140 MANUAL THERAPY 1/> REGIONS: CPT | Performed by: PHYSICAL THERAPIST

## 2023-08-14 PROCEDURE — 97530 THERAPEUTIC ACTIVITIES: CPT | Performed by: PHYSICAL THERAPIST

## 2023-08-14 NOTE — PROGRESS NOTES
Physical Therapy Daily Treatment Note    Patient: Ariana Michel   : 1955  Diagnosis/ICD-10 Code:  Left knee pain, unspecified chronicity [M25.562]  Referring practitioner: Alexis Hernandez*  Date of Initial Visit: Type: THERAPY  Noted: 2023  Today's Date: 2023  Patient seen for 7 sessions             Subjective Patient continues to feel good.  The only discomfort she notices is when getting out of low couch, some medial joint line discomfort.  She went on a quilting retreat all weekend and only noticed swelling.      Objective   See Exercise, Manual, and Modality Logs for complete treatment. Ambulates without AD and normal gait pattern.  AAROM L knee flexion = 111 deg, compared to 124 deg R; extension = -3 deg L compared to -5 deg R. SLS = 20 sec L, 20 sec R. Pitting edema noted L lower leg and ankle today. Midpatellar girth: L = 51.0 cm, R = 46.0 cm (difference of 6 cm).  Malleolar girth: L = 48.0 cm, R = 46.0 cm (difference of 2 cm).  Able to perform repeated sit to stand on elevated surface. Encouraged patient to begin longer walks at home outside. Still needs assistance donning sock, able to don/doff shoe independently. Resumed ice today with elevation 10 min. Initial [place] knee score indicated 19% today, improved from 37% initially, will send MD note for 23 appointment.  Add leg press and recumbent bike NEXT.      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  Increase L knee flexion to 120 deg in 12 weeks to allow normal sit to stand transfer - NOT MET  Perform 10 reps of repeated sit to stand without arms in 30 seconds in 12 weeks - NOT MET  Able to walk for exercise 20 min in 12 weeks - NOT MET    Progress per Plan of Care exp 10/16/23           Timed:         Manual Therapy:    15     mins  48823;     Therapeutic Exercise:    15     mins  14967;     Neuromuscular Max:        mins  63506;    Therapeutic Activity:     10     mins  75952;     Gait Training:           mins  28161;     Ultrasound:          mins  05928;    Ionto                                   mins   28497  Self Care                            mins   70026      Un-Timed:  Electrical Stimulation:         mins  43136 ( );  Dry Needling          mins self-pay  Traction          mins 67596  Low Eval          Mins  28446  Mod Eval          Mins  16187  High Eval                            Mins  65963  Canalith Repos                   mins  98575    Timed Treatment:   40   mins   Total Treatment:     40   mins    Robin A Sprigler, PT  Physical Therapist

## 2023-08-17 ENCOUNTER — TREATMENT (OUTPATIENT)
Dept: PHYSICAL THERAPY | Facility: CLINIC | Age: 68
End: 2023-08-17
Payer: MEDICARE

## 2023-08-17 DIAGNOSIS — Z96.652 S/P TOTAL KNEE ARTHROPLASTY, LEFT: ICD-10-CM

## 2023-08-17 DIAGNOSIS — M25.562 LEFT KNEE PAIN, UNSPECIFIED CHRONICITY: Primary | ICD-10-CM

## 2023-08-17 PROCEDURE — 97110 THERAPEUTIC EXERCISES: CPT | Performed by: PHYSICAL THERAPIST

## 2023-08-17 PROCEDURE — 97530 THERAPEUTIC ACTIVITIES: CPT | Performed by: PHYSICAL THERAPIST

## 2023-08-17 NOTE — PROGRESS NOTES
Physical Therapy Daily Treatment Note      Patient: Ariana Michel   : 1955  Diagnosis/ICD-10 Code:  Left knee pain, unspecified chronicity [M25.562]   Problems Addressed this Visit    None  Visit Diagnoses       Left knee pain, unspecified chronicity    -  Primary    S/P total knee arthroplasty, left              Diagnoses         Codes Comments    Left knee pain, unspecified chronicity    -  Primary ICD-10-CM: M25.562  ICD-9-CM: 719.46     S/P total knee arthroplasty, left     ICD-10-CM: Z96.652  ICD-9-CM: V43.65            Referring practitioner: Alexis Hernandez*  Date of Initial Visit: Type: THERAPY  Noted: 2023  Today's Date: 2023    VISIT#: 8    Subjective Patient reports feeling mild but manageable AM stiffness this morning.       Objective resumed bike with full revolutions    See Exercise, Manual, and Modality Logs for complete treatment.     Assessment/Plan Patient tolerated all performed therapeutic exercise/activity well with no reports of increased symptoms. Patient continues to demonstrate gradual gains towards functional goals at this time.   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  Increase L knee flexion to 120 deg in 12 weeks to allow normal sit to stand transfer - NOT MET  Perform 10 reps of repeated sit to stand without arms in 30 seconds in 12 weeks - NOT MET  Able to walk for exercise 20 min in 12 weeks - NOT MET    Progress per Plan of Care and Progress  strengthening /stabilization /functional activity         Timed:         Manual Therapy:    5     mins  94165;     Therapeutic Exercise:    15     mins  97067;     Neuromuscular Max:        mins  99355;    Therapeutic Activity:     15     mins  49568;     Gait Training:           mins  40432;     Ultrasound:          mins  87495;    Ionto                                   mins   89153  Self Care                    _____  mins   33069  Canalith Repos                   mins  30773    Un-Timed:  Electrical Stimulation:         mins  31352 ( );  Dry Needling          mins self-pay   Traction          mins 03590    Timed Treatment:   35   mins   Total Treatment:     35   mins    Jaya Michel PTA  [place] License [id number]  Physical Therapist Assistant

## 2023-08-21 ENCOUNTER — TREATMENT (OUTPATIENT)
Dept: PHYSICAL THERAPY | Facility: CLINIC | Age: 68
End: 2023-08-21
Payer: MEDICARE

## 2023-08-21 DIAGNOSIS — Z96.652 S/P TOTAL KNEE ARTHROPLASTY, LEFT: ICD-10-CM

## 2023-08-21 DIAGNOSIS — M25.562 LEFT KNEE PAIN, UNSPECIFIED CHRONICITY: Primary | ICD-10-CM

## 2023-08-21 PROCEDURE — 97110 THERAPEUTIC EXERCISES: CPT | Performed by: PHYSICAL THERAPIST

## 2023-08-21 PROCEDURE — 97530 THERAPEUTIC ACTIVITIES: CPT | Performed by: PHYSICAL THERAPIST

## 2023-08-21 NOTE — PROGRESS NOTES
"     Physical Therapy Daily Treatment Note    Patient: Ariana Michel   : 1955  Diagnosis/ICD-10 Code:  Left knee pain, unspecified chronicity [M25.562]  Referring practitioner: Alexis Hernandez*  Date of Initial Visit: Type: THERAPY  Noted: 2023  Today's Date: 2023  Patient seen for 9 sessions             Subjective Patient happy she was able to tolerate recumbent bike in clinic, she missed having her bike at home.  She is planning to get in the pool to help swelling.  Her back is limiting her walking tolerance.  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    Objective   See Exercise, Manual, and Modality Logs for complete treatment. Ambulates without AD and normal gait pattern.  AAROM L knee flexion = 111 deg, compared to 124 deg R; extension = -3 deg L compared to -5 deg R. SLS = 20 sec L, 20 sec R. Pitting edema still noted L lower leg and ankle today. Midpatellar girth: L = 51.0 cm, R = 46.0 cm (difference of 6 cm).  Malleolar girth: L = 48.0 cm, R = 46.0 cm (difference of 2 cm).  Able to perform repeated sit to stand on elevated surface. Encouraged patient to begin longer walks at home outside. Still needs assistance donning sock, able to don/doff shoe independently. Resumed ice today with elevation 10 min. [place] knee score indicated 19% 8/14, improved from 37% initially.  Added leg press with good tolerance and recumbent bike x 10 min without resistance. Able to tolerate 6\" step up and 4\" step down.  Encouraged patient to try reciprocal steps up at home. Progressed to prone manual flexion stretch.      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  Increase L knee flexion to 120 deg in 12 weeks to allow normal sit to stand transfer - NOT MET  Perform 10 reps of repeated sit to stand without arms in 30 seconds in 12 weeks - NOT MET  Able to walk for exercise 20 min in 12 weeks - NOT MET    Progress per Plan of Care exp 10/16/23           Timed:         Manual Therapy:    5     mins  52896;     Therapeutic Exercise:    15     mins  88424;     Neuromuscular Max:        mins  72424;    Therapeutic Activity:     15     mins  51263;     Gait Training:           mins  89278;     Ultrasound:          mins  27687;    Ionto                                   mins   95000  Self Care                            mins   30908      Un-Timed:  Electrical Stimulation:         mins  77739 ( );  Dry Needling          mins self-pay  Traction          mins 68754  Low Eval          Mins  50774  Mod Eval          Mins  61679  High Eval                            Mins  46939  Canalith Repos                   mins  55668    Timed Treatment:   35   mins   Total Treatment:     35   mins    Robin A Sprigler, PT  Physical Therapist

## 2023-08-24 ENCOUNTER — TREATMENT (OUTPATIENT)
Dept: PHYSICAL THERAPY | Facility: CLINIC | Age: 68
End: 2023-08-24
Payer: MEDICARE

## 2023-08-24 DIAGNOSIS — Z96.652 S/P TOTAL KNEE ARTHROPLASTY, LEFT: ICD-10-CM

## 2023-08-24 DIAGNOSIS — M25.562 LEFT KNEE PAIN, UNSPECIFIED CHRONICITY: Primary | ICD-10-CM

## 2023-08-24 PROCEDURE — 97530 THERAPEUTIC ACTIVITIES: CPT | Performed by: PHYSICAL THERAPIST

## 2023-08-24 PROCEDURE — 97110 THERAPEUTIC EXERCISES: CPT | Performed by: PHYSICAL THERAPIST

## 2023-08-24 NOTE — PROGRESS NOTES
Physical Therapy Daily Treatment Note      Patient: Ariana Michel   : 1955  Diagnosis/ICD-10 Code:  Left knee pain, unspecified chronicity [M25.562]   Problems Addressed this Visit    None  Visit Diagnoses       Left knee pain, unspecified chronicity    -  Primary    S/P total knee arthroplasty, left              Diagnoses         Codes Comments    Left knee pain, unspecified chronicity    -  Primary ICD-10-CM: M25.562  ICD-9-CM: 719.46     S/P total knee arthroplasty, left     ICD-10-CM: Z96.652  ICD-9-CM: V43.65            Referring practitioner: Alexis Hernandez*  Date of Initial Visit: Type: THERAPY  Noted: 2023  Today's Date: 2023    VISIT#: 10    Subjective Patient reports feeling pretty good the only difficulty she is having is getting in and out of bathtub.       Objective     See Exercise, Manual, and Modality Logs for complete treatment.     Assessment/Plan Patient tolerated all performed therapeutic exercise/activity well with no report of increased symptoms. Patient continues to progress well towards functional goals at this time.   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  Increase L knee flexion to 120 deg in 12 weeks to allow normal sit to stand transfer - NOT MET  Perform 10 reps of repeated sit to stand without arms in 30 seconds in 12 weeks - NOT MET  Able to walk for exercise 20 min in 12 weeks - NOT MET    Progress per Plan of Care and Progress strengthening  /stabilization /functional activity         Timed:         Manual Therapy:    5     mins  67853;     Therapeutic Exercise:    15     mins  20824;     Neuromuscular Max:        mins  60197;    Therapeutic Activity:     15     mins  29763;     Gait Training:           mins  92083;     Ultrasound:          mins  60693;    Ionto                                   mins   59652  Self Care                    _____  mins   17008  Canalith Repos                   mins  70497    Un-Timed:  Electrical Stimulation:         mins  76363 ( );  Dry Needling          mins self-pay   Traction          mins 49965    Timed Treatment:   35   mins   Total Treatment:     35   mins    Jaya Michel PTA  [place] License [id number]  Physical Therapist Assistant

## 2023-08-28 ENCOUNTER — TREATMENT (OUTPATIENT)
Dept: PHYSICAL THERAPY | Facility: CLINIC | Age: 68
End: 2023-08-28
Payer: MEDICARE

## 2023-08-28 DIAGNOSIS — Z96.652 S/P TOTAL KNEE ARTHROPLASTY, LEFT: ICD-10-CM

## 2023-08-28 DIAGNOSIS — M25.562 LEFT KNEE PAIN, UNSPECIFIED CHRONICITY: Primary | ICD-10-CM

## 2023-08-28 PROCEDURE — 97110 THERAPEUTIC EXERCISES: CPT | Performed by: PHYSICAL THERAPIST

## 2023-08-28 PROCEDURE — 97530 THERAPEUTIC ACTIVITIES: CPT | Performed by: PHYSICAL THERAPIST

## 2023-08-28 NOTE — PROGRESS NOTES
"Discharge Summary  Discharge Summary from Physical Therapy Report      Goals: Partially Met    Discharge Plan: Continue with current home exercise program as instructed    Comments Patient voices readiness for discharge    Date of Discharge 23        Robin A Sprigler, PT  Physical Therapist           Physical Therapy Daily Treatment Note    Patient: Ariana Michel   : 1955  Diagnosis/ICD-10 Code:  Left knee pain, unspecified chronicity [M25.562]  Referring practitioner: Alexis Hernandez*  Date of Initial Visit: Type: THERAPY  Noted: 2023  Today's Date: 2023  Patient seen for 11 sessions             Subjective Patient voices nearing readiness for discharge and self-management/HEP.  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 Still has some difficulty getting in/out of bathtub.    Objective   See Exercise, Manual, and Modality Logs for complete treatment. Ambulates without AD and normal gait pattern.  AAROM L knee flexion = 111 deg, compared to 124 deg R; extension = -3 deg L compared to -5 deg R. SLS = 30 sec L, 30 sec R. Pitting edema still noted L lower leg and ankle today.  Able to perform repeated sit to stand on elevated surface. Encouraged patient to begin longer walks at home outside. Still needs assistance donning sock, able to don/doff shoe independently. Resumed ice today with elevation 10 min. [place] knee score indicated 19% 8/14, improved from 37% initially.  Able to tolerate 6\" step up and 4\" step down.  Encouraged patient to try reciprocal steps up at home. Continued with prone manual flexion stretch. Walked outside 500'+ with SOA but no pain. Patient nearing readiness for discharge.      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  Increase L knee flexion to 120 deg in 12 weeks to allow normal sit to stand transfer - MET  Perform 10 reps of repeated sit to stand without arms in 30 seconds in 12 weeks - MET  Able to walk for exercise 20 min in 12 weeks - PARTIALLY MET     Progress per Plan of Care exp 10/16/23           Timed:         Manual Therapy:    5     mins  18562;     Therapeutic Exercise:    15     mins  75508;     Neuromuscular Max:        mins  19795;    Therapeutic Activity:     15     mins  65596;     Gait Training:           mins  73683;     Ultrasound:          mins  06097;    Ionto                                   mins   49114  Self Care                            mins   55170      Un-Timed:  Electrical Stimulation:         mins  54708 ( );  Dry Needling          mins self-pay  Traction          mins 58277  Low Eval          Mins  82218  Mod Eval          Mins  19040  High Eval                            Mins  50830  Canalith Repos                   mins  33810    Timed Treatment:   35   mins   Total Treatment:     35   mins    Robin A Sprigler, PT  Physical Therapist

## 2023-09-20 ENCOUNTER — TRANSCRIBE ORDERS (OUTPATIENT)
Dept: ADMINISTRATIVE | Facility: HOSPITAL | Age: 68
End: 2023-09-20
Payer: COMMERCIAL

## 2023-09-20 DIAGNOSIS — Z13.6 ENCOUNTER FOR SCREENING FOR VASCULAR DISEASE: Primary | ICD-10-CM

## 2023-10-17 ENCOUNTER — HOSPITAL ENCOUNTER (OUTPATIENT)
Dept: CT IMAGING | Facility: HOSPITAL | Age: 68
Discharge: HOME | End: 2023-10-17

## 2023-10-17 ENCOUNTER — HOSPITAL ENCOUNTER (OUTPATIENT)
Dept: ULTRASOUND IMAGING | Facility: HOSPITAL | Age: 68
Discharge: HOME | End: 2023-10-17

## 2023-10-17 DIAGNOSIS — Z13.6 ENCOUNTER FOR SCREENING FOR VASCULAR DISEASE: ICD-10-CM

## 2023-10-17 LAB
BH CV XLRA MEAS - PAD LEFT ABI PT: 1.2
BH CV XLRA MEAS - PAD LEFT ARM: 130 MMHG
BH CV XLRA MEAS - PAD LEFT LEG PT: 155 MMHG
BH CV XLRA MEAS - PAD RIGHT ABI PT: 1.1
BH CV XLRA MEAS - PAD RIGHT ARM: 131 MMHG
BH CV XLRA MEAS - PAD RIGHT LEG PT: 150 MMHG
BH CV XLRA MEAS - PROX AO DIAM: 1.9 CM
BH CV XLRA MEAS LEFT ICA/CCA RATIO: 0.5
BH CV XLRA MEAS LEFT MID CCA PSV: 67.2 CM/SEC
BH CV XLRA MEAS LEFT MID ICA PSV: 36.3 CM/SEC
BH CV XLRA MEAS RIGHT ICA/CCA RATIO: 1.2
BH CV XLRA MEAS RIGHT MID CCA PSV: 73.4 CM/SEC
BH CV XLRA MEAS RIGHT MID ICA PSV: 86.6 CM/SEC

## 2023-10-17 PROCEDURE — 75571 CT HRT W/O DYE W/CA TEST: CPT

## 2023-10-17 PROCEDURE — 93799 UNLISTED CV SVC/PROCEDURE: CPT

## 2024-07-22 ENCOUNTER — TRANSCRIBE ORDERS (OUTPATIENT)
Dept: PHYSICAL THERAPY | Facility: CLINIC | Age: 69
End: 2024-07-22
Payer: COMMERCIAL

## 2024-07-22 DIAGNOSIS — M16.11 UNILATERAL PRIMARY OSTEOARTHRITIS, RIGHT HIP: Primary | ICD-10-CM

## 2024-07-25 ENCOUNTER — TRANSCRIBE ORDERS (OUTPATIENT)
Dept: ADMINISTRATIVE | Facility: HOSPITAL | Age: 69
End: 2024-07-25
Payer: COMMERCIAL

## 2024-07-25 ENCOUNTER — HOSPITAL ENCOUNTER (OUTPATIENT)
Dept: CARDIOLOGY | Facility: HOSPITAL | Age: 69
Discharge: HOME | End: 2024-07-25
Payer: MEDICARE

## 2024-07-25 ENCOUNTER — HOSPITAL ENCOUNTER (OUTPATIENT)
Dept: GENERAL RADIOLOGY | Facility: HOSPITAL | Age: 69
Discharge: HOME | End: 2024-07-25
Payer: MEDICARE

## 2024-07-25 ENCOUNTER — LAB (OUTPATIENT)
Dept: LAB | Facility: HOSPITAL | Age: 69
End: 2024-07-25
Payer: MEDICARE

## 2024-07-25 DIAGNOSIS — Z01.818 PRE-OP TESTING: ICD-10-CM

## 2024-07-25 DIAGNOSIS — Z01.818 PREOP TESTING: ICD-10-CM

## 2024-07-25 DIAGNOSIS — M16.11 PRIMARY OSTEOARTHRITIS OF RIGHT HIP: Primary | ICD-10-CM

## 2024-07-25 DIAGNOSIS — M16.11 PRIMARY OSTEOARTHRITIS OF RIGHT HIP: ICD-10-CM

## 2024-07-25 LAB
ALBUMIN SERPL-MCNC: 4.1 G/DL (ref 3.5–5.2)
ALBUMIN/GLOB SERPL: 1.5 G/DL
ALP SERPL-CCNC: 85 U/L (ref 39–117)
ALT SERPL W P-5'-P-CCNC: 21 U/L (ref 1–33)
ANION GAP SERPL CALCULATED.3IONS-SCNC: 8.2 MMOL/L (ref 5–15)
AST SERPL-CCNC: 19 U/L (ref 1–32)
BACTERIA UR QL AUTO: ABNORMAL /HPF
BASOPHILS # BLD AUTO: 0.04 10*3/MM3 (ref 0–0.2)
BASOPHILS NFR BLD AUTO: 0.5 % (ref 0–1.5)
BILIRUB SERPL-MCNC: 0.5 MG/DL (ref 0–1.2)
BILIRUB UR QL STRIP: NEGATIVE
BUN SERPL-MCNC: 20 MG/DL (ref 8–23)
BUN/CREAT SERPL: 29.9 (ref 7–25)
CALCIUM SPEC-SCNC: 9.1 MG/DL (ref 8.6–10.5)
CHLORIDE SERPL-SCNC: 104 MMOL/L (ref 98–107)
CLARITY UR: CLEAR
CO2 SERPL-SCNC: 28.8 MMOL/L (ref 22–29)
COLOR UR: YELLOW
CREAT SERPL-MCNC: 0.67 MG/DL (ref 0.57–1)
DEPRECATED RDW RBC AUTO: 42.5 FL (ref 37–54)
EGFRCR SERPLBLD CKD-EPI 2021: 95.9 ML/MIN/1.73
EOSINOPHIL # BLD AUTO: 0.19 10*3/MM3 (ref 0–0.4)
EOSINOPHIL NFR BLD AUTO: 2.4 % (ref 0.3–6.2)
ERYTHROCYTE [DISTWIDTH] IN BLOOD BY AUTOMATED COUNT: 12.8 % (ref 12.3–15.4)
GLOBULIN UR ELPH-MCNC: 2.8 GM/DL
GLUCOSE SERPL-MCNC: 76 MG/DL (ref 65–99)
GLUCOSE UR STRIP-MCNC: NEGATIVE MG/DL
HBA1C MFR BLD: 5.58 % (ref 4.8–5.6)
HCT VFR BLD AUTO: 40.4 % (ref 34–46.6)
HGB BLD-MCNC: 13.2 G/DL (ref 12–15.9)
HGB UR QL STRIP.AUTO: NEGATIVE
HYALINE CASTS UR QL AUTO: ABNORMAL /LPF
IMM GRANULOCYTES # BLD AUTO: 0.02 10*3/MM3 (ref 0–0.05)
IMM GRANULOCYTES NFR BLD AUTO: 0.2 % (ref 0–0.5)
INR PPP: 0.95 (ref 0.93–1.1)
KETONES UR QL STRIP: NEGATIVE
LEUKOCYTE ESTERASE UR QL STRIP.AUTO: ABNORMAL
LYMPHOCYTES # BLD AUTO: 2.03 10*3/MM3 (ref 0.7–3.1)
LYMPHOCYTES NFR BLD AUTO: 25.3 % (ref 19.6–45.3)
MCH RBC QN AUTO: 29.6 PG (ref 26.6–33)
MCHC RBC AUTO-ENTMCNC: 32.7 G/DL (ref 31.5–35.7)
MCV RBC AUTO: 90.6 FL (ref 79–97)
MONOCYTES # BLD AUTO: 0.67 10*3/MM3 (ref 0.1–0.9)
MONOCYTES NFR BLD AUTO: 8.4 % (ref 5–12)
NEUTROPHILS NFR BLD AUTO: 5.06 10*3/MM3 (ref 1.7–7)
NEUTROPHILS NFR BLD AUTO: 63.2 % (ref 42.7–76)
NITRITE UR QL STRIP: NEGATIVE
NRBC BLD AUTO-RTO: 0 /100 WBC (ref 0–0.2)
PH UR STRIP.AUTO: 7.5 [PH] (ref 5–8)
PLATELET # BLD AUTO: 257 10*3/MM3 (ref 140–450)
PMV BLD AUTO: 9.8 FL (ref 6–12)
POTASSIUM SERPL-SCNC: 3.7 MMOL/L (ref 3.5–5.2)
PROT SERPL-MCNC: 6.9 G/DL (ref 6–8.5)
PROT UR QL STRIP: NEGATIVE
PROTHROMBIN TIME: 10.4 SECONDS (ref 9.6–11.7)
QT INTERVAL: 447 MS
QTC INTERVAL: 485 MS
RBC # BLD AUTO: 4.46 10*6/MM3 (ref 3.77–5.28)
RBC # UR STRIP: ABNORMAL /HPF
REF LAB TEST METHOD: ABNORMAL
SODIUM SERPL-SCNC: 141 MMOL/L (ref 136–145)
SP GR UR STRIP: 1.02 (ref 1–1.03)
SQUAMOUS #/AREA URNS HPF: ABNORMAL /HPF
UROBILINOGEN UR QL STRIP: ABNORMAL
WBC # UR STRIP: ABNORMAL /HPF
WBC NRBC COR # BLD AUTO: 8.01 10*3/MM3 (ref 3.4–10.8)

## 2024-07-25 PROCEDURE — 36415 COLL VENOUS BLD VENIPUNCTURE: CPT

## 2024-07-25 PROCEDURE — 85025 COMPLETE CBC W/AUTO DIFF WBC: CPT

## 2024-07-25 PROCEDURE — 93005 ELECTROCARDIOGRAM TRACING: CPT | Performed by: ORTHOPAEDIC SURGERY

## 2024-07-25 PROCEDURE — 71046 X-RAY EXAM CHEST 2 VIEWS: CPT

## 2024-07-25 PROCEDURE — 85610 PROTHROMBIN TIME: CPT

## 2024-07-25 PROCEDURE — 83036 HEMOGLOBIN GLYCOSYLATED A1C: CPT

## 2024-07-25 PROCEDURE — 80053 COMPREHEN METABOLIC PANEL: CPT

## 2024-07-25 PROCEDURE — 87086 URINE CULTURE/COLONY COUNT: CPT

## 2024-07-25 PROCEDURE — 81001 URINALYSIS AUTO W/SCOPE: CPT

## 2024-07-26 LAB — BACTERIA SPEC AEROBE CULT: NO GROWTH

## 2024-08-01 LAB
QT INTERVAL: 447 MS
QTC INTERVAL: 485 MS

## 2024-08-06 ENCOUNTER — TREATMENT (OUTPATIENT)
Dept: PHYSICAL THERAPY | Facility: CLINIC | Age: 69
End: 2024-08-06
Payer: MEDICARE

## 2024-08-06 DIAGNOSIS — M25.561 CHRONIC PAIN OF RIGHT KNEE: ICD-10-CM

## 2024-08-06 DIAGNOSIS — R26.2 DIFFICULTY WALKING: ICD-10-CM

## 2024-08-06 DIAGNOSIS — G89.29 CHRONIC PAIN OF RIGHT KNEE: ICD-10-CM

## 2024-08-06 DIAGNOSIS — Z96.641 STATUS POST TOTAL HIP REPLACEMENT, RIGHT: Primary | ICD-10-CM

## 2024-08-06 NOTE — PROGRESS NOTES
"  Physical Therapy Initial Evaluation and Plan of Care                           [address] [place] [address] [address], [address], [place]  [address]    Patient: Ariana Michel   : 1955  Diagnosis/ICD-10 Code:  Status post total hip replacement, right [Z96.641]  Referring practitioner: Alexis Hernandez*  Date of Initial Visit: 2024  Today's Date: 2024  Patient seen for 1 sessions           Subjective Questionnaire: ox[place] hip:  = 38%  function; 62% impairment      Subjective Evaluation    History of Present Illness  Mechanism of injury: Pt presents post R BOO. She's had L BOO followed by OP PT. She notes R knee pain is more aggravating than hip at this time.   She is using FWW bc of knee pain. The R knee was aggravated before she even had her hip done. She doesn't want to irritate the R knee. The R knee will need to be replaced at some point. She had to quit her meloxicam & ibuprofen for the 2 wks prior to surgery so her knee pain increased.    She is sleeping in recliner bc of her compression pumps, she doesn't want to get tangled in the tubes in bed.     L BOO, L TKA ~ 1 yr ago      Patient Occupation: cleans houses Social Support  Patient lives at: 3 stairs to enter no rail, tub/shower combo w/bench no grab bars.    Patient Goals  Patient goal: \"be able to be up and about and not be scared to death you're gonna get your knee hurt.\"         Objective          Active Range of Motion   Left Hip   Flexion: 90 (in hooklying) degrees   External rotation (90/90): 15 degrees   Internal rotation (90/90): 0 degrees     Right Hip   Flexion: 20 degrees   External rotation (90/90): 0 degrees   Internal rotation (90/90): 0 degrees     Additional Active Range of Motion Details  Lower ab endurance: 0s, unable to get to supine single SLR    Patellar Static Positioning     Additional Patellar Static Positioning Details  Lateral patellar tracking    Strength/Myotome Testing     Left Hip   Planes of Motion   Flexion: " 4-  Abduction: 3-    Right Hip   Planes of Motion   Flexion: 2+  Abduction: 3-    Tests     Left Hip   Positive Ramone.     Right Hip   Positive Ramone.     Right Knee   Positive patella-femoral grind.     Functional Assessment     Comments  30s STS: 6 w/B hands  R ITB stiffness/tenderness  R lateral patellar tracking            Assessment & Plan       Assessment  Impairments: abnormal gait, abnormal muscle tone, abnormal or restricted ROM, activity intolerance, impaired balance, impaired physical strength, lacks appropriate home exercise program, pain with function and weight-bearing intolerance   Functional limitations: carrying objects, lifting, sleeping, walking, uncomfortable because of pain, moving in bed and sitting   Assessment details: Ariana is a 70 yo female presenting to OP PT s/p R BOO. DOS: 7/31/24. Drainage tube in place. Pt has chronic R knee pain more limiting than R hip pain. 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   Patient presents with decreased R hip mobility, decreased R hip strength, impaired ability to perform transitions, gait, and stairs, limited walking tolerance, disrupted sleep pattern, and inability to work at this time. Patient presents with the impairments listed above and based on the objective findings and the physical therapy evaluation, the patient's condition has the potential to improve in response to therapy. The patient's condition and/or services required are at a level of complexity that necessitates the skill & supervision of a physical therapist.    Prognosis: good    Goals  Plan Goals: STG to be met in 6 wks  1. Pt will be safe, independent, and compliant with HEP to optimize recovery and self management of symptoms.   2. Pt will demonstrate at least 90 deg R hip flexion actively for sitting upright in chairs.   3. Pt will demonstrate at least 1 STS w/o hands for rising from toilets & restaurant chairs.    LTG to be met  in 12 wks  1. Pt will demonstrate at least 8 STS without UE for rising from toilets & restaurant chairs.  2. Pt will improve lower ab endurance from 0 to at least 10s for sitting up in bed.   3. Pt will demonstrate R hip extension to 0 for lying supine in bed without pain.  4. Pt will demonstrate at least 30 deg R & L hip ER for getting in/out of car.  6. Pt will improve [place] hip from 18/48 to at least 35/48.       Plan  Therapy options: will be seen for skilled therapy services  Planned modality interventions: cryotherapy, dry needling, high voltage pulsed current (pain management), high voltage pulsed current (spasm management), TENS, thermotherapy (hydrocollator packs) and ultrasound  Planned therapy interventions: abdominal trunk stabilization, ADL retraining, balance/weight-bearing training, body mechanics training, dressing changes, fine motor coordination training, flexibility, functional ROM exercises, gait training, home exercise program, IADL retraining, joint mobilization, manual therapy, motor coordination training, neuromuscular re-education, postural training, soft tissue mobilization, spinal/joint mobilization, strengthening, stretching, therapeutic activities and transfer training  Frequency: 2x week  Duration in weeks: 12  Treatment plan discussed with: patient      Timed:         Manual Therapy:         mins  73355;     Therapeutic Exercise:    15     mins  02963;     Neuromuscular Max:        mins  62834;    Therapeutic Activity:          mins  30351;     Gait Training:           mins  42313;     Ultrasound:     10     mins  61134;    Self-care  __15__ mins 35325  Tests & Measures              mins  11150      Un-Timed:  Electrical Stimulation:         mins  11863 ( );  Dry Needling          mins self-pay 10291  Traction          mins 40810  Low Eval          mins  35004  Mod Eval          mins  97720  High Eval                        15    mins  85424  Canal repositioning ___  mins   89747        Timed Treatment:     40 mins   Total Treatment:     55   mins    PT SIGNATURE: Kristi Bucio PT, DPT, cert. DN  [place] license # [id number]  Electronically signed by Kristi Bucio PT, 08/06/24, 7:21 AM EDT    Initial Certification  Certification Period: 8/6/2024 through 11/3/2024  I certify that the therapy services are furnished while this patient is under my care.  The services outlined above are required by this patient, and will be reviewed every 90 days.     PHYSICIAN: Alexis Hernandez II, MD    NPI: [id number]                                       DATE: ______________________________________________________________________________________________     Please sign and return via fax to [phone]. Thank you, [facility].

## 2024-08-08 ENCOUNTER — TREATMENT (OUTPATIENT)
Dept: PHYSICAL THERAPY | Facility: CLINIC | Age: 69
End: 2024-08-08
Payer: MEDICARE

## 2024-08-08 DIAGNOSIS — R26.2 DIFFICULTY WALKING: ICD-10-CM

## 2024-08-08 DIAGNOSIS — G89.29 CHRONIC PAIN OF RIGHT KNEE: ICD-10-CM

## 2024-08-08 DIAGNOSIS — M25.561 CHRONIC PAIN OF RIGHT KNEE: ICD-10-CM

## 2024-08-08 DIAGNOSIS — Z96.641 STATUS POST TOTAL HIP REPLACEMENT, RIGHT: Primary | ICD-10-CM

## 2024-08-08 NOTE — PROGRESS NOTES
Physical Therapy Daily Treatment Note      Patient: Ariana Michel   : 1955  Diagnosis/ICD-10 Code:  Status post total hip replacement, right [Z96.641]   Problems Addressed this Visit    None  Visit Diagnoses       Status post total hip replacement, right    -  Primary    Chronic pain of right knee        Difficulty walking              Diagnoses         Codes Comments    Status post total hip replacement, right    -  Primary ICD-10-CM: Z96.641  ICD-9-CM: V43.64     Chronic pain of right knee     ICD-10-CM: M25.561, G89.29  ICD-9-CM: 719.46, 338.29     Difficulty walking     ICD-10-CM: R26.2  ICD-9-CM: 719.7            Referring practitioner: Alexis Hernandez*  Date of Initial Visit: Type: THERAPY  Noted: 2024  Today's Date: 2024    VISIT#: 2    Subjective Patient reports R knee is feeling a little better since last visit. Hip continues to feel better since having surgery and is pleased with progress.       Objective     See Exercise, Manual, and Modality Logs for complete treatment.     Assessment/Plan Patient with good response to US on R knee with decreased symptoms reported. Patient tolerated all progressed therapeutic exercise well with no reports of increased symptoms. Patient will continue to benefit from improved R LE strengthening for improved stability/tolerance with daily functional activity.   Goals  Plan Goals: STG to be met in 6 wks  1. Pt will be safe, independent, and compliant with HEP to optimize recovery and self management of symptoms.   2. Pt will demonstrate at least 90 deg R hip flexion actively for sitting upright in chairs.   3. Pt will demonstrate at least 1 STS w/o hands for rising from toilets & restaurant chairs.     LTG to be met in 12 wks  1. Pt will demonstrate at least 8 STS without UE for rising from toilets & restaurant chairs.  2. Pt will improve lower ab endurance from 0 to at least 10s for sitting up in bed.   3. Pt will demonstrate R hip extension to 0 for  lying supine in bed without pain.  4. Pt will demonstrate at least 30 deg R & L hip ER for getting in/out of car.  6. Pt will improve [place] hip from 18/48 to at least 35/48.     Progress per Plan of Care and Progress strengthening /stabilization /functional activity         Timed:         Manual Therapy:         mins  30190;     Therapeutic Exercise:    15     mins  46572;     Neuromuscular Max:    15    mins  36603;    Therapeutic Activity:          mins  55563;     Gait Training:           mins  33450;     Ultrasound:     10     mins  33826;    Ionto                                   mins   94490  Self Care                    _____  mins   70518  Canalith Repos                   mins  48811    Un-Timed:  Electrical Stimulation:         mins  54781 ( );  Dry Needling          mins self-pay   Traction          mins 20572    Timed Treatment:   40   mins   Total Treatment:     40   mins    Jaya Michel PTA  [place] License [id number]  Physical Therapist Assistant

## 2024-08-12 ENCOUNTER — TREATMENT (OUTPATIENT)
Dept: PHYSICAL THERAPY | Facility: CLINIC | Age: 69
End: 2024-08-12
Payer: MEDICARE

## 2024-08-12 DIAGNOSIS — G89.29 CHRONIC PAIN OF RIGHT KNEE: ICD-10-CM

## 2024-08-12 DIAGNOSIS — M25.561 CHRONIC PAIN OF RIGHT KNEE: ICD-10-CM

## 2024-08-12 DIAGNOSIS — Z96.641 STATUS POST TOTAL HIP REPLACEMENT, RIGHT: Primary | ICD-10-CM

## 2024-08-12 DIAGNOSIS — R26.2 DIFFICULTY WALKING: ICD-10-CM

## 2024-08-12 PROCEDURE — 97110 THERAPEUTIC EXERCISES: CPT | Performed by: PHYSICAL THERAPIST

## 2024-08-12 PROCEDURE — 97035 APP MDLTY 1+ULTRASOUND EA 15: CPT | Performed by: PHYSICAL THERAPIST

## 2024-08-12 PROCEDURE — 97112 NEUROMUSCULAR REEDUCATION: CPT | Performed by: PHYSICAL THERAPIST

## 2024-08-12 NOTE — PROGRESS NOTES
Physical Therapy Daily Treatment Note      Patient: Ariana Michel   : 1955  Diagnosis/ICD-10 Code:  Status post total hip replacement, right [Z96.641]   Problems Addressed this Visit    None  Visit Diagnoses       Status post total hip replacement, right    -  Primary    Chronic pain of right knee        Difficulty walking              Diagnoses         Codes Comments    Status post total hip replacement, right    -  Primary ICD-10-CM: Z96.641  ICD-9-CM: V43.64     Chronic pain of right knee     ICD-10-CM: M25.561, G89.29  ICD-9-CM: 719.46, 338.29     Difficulty walking     ICD-10-CM: R26.2  ICD-9-CM: 719.7            Referring practitioner: Alexis Hernandez*  Date of Initial Visit: Type: THERAPY  Noted: 2024  Today's Date: 2024    VISIT#: 3    Subjective Patient reports feeling mild but manageable muscle soreness, has attempted to walk with cane at home but does not feel very stable/safe with cane at this time.       Objective     See Exercise, Manual, and Modality Logs for complete treatment.     Assessment/Plan Patient tolerated all progressed therapeutic exercise/activity well with only reports of increased muscle fatigue. Patient will continue to benefit from improved R LE strengthening for improved stability/tolerance with daily functional activity.     Goals  Plan Goals: STG to be met in 6 wks  1. Pt will be safe, independent, and compliant with HEP to optimize recovery and self management of symptoms.   2. Pt will demonstrate at least 90 deg R hip flexion actively for sitting upright in chairs.   3. Pt will demonstrate at least 1 STS w/o hands for rising from toilets & restaurant chairs.     LTG to be met in 12 wks  1. Pt will demonstrate at least 8 STS without UE for rising from toilets & restaurant chairs.  2. Pt will improve lower ab endurance from 0 to at least 10s for sitting up in bed.   3. Pt will demonstrate R hip extension to 0 for lying supine in bed without pain.  4. Pt will  demonstrate at least 30 deg R & L hip ER for getting in/out of car.  6. Pt will improve [place] hip from 18/48 to at least 35/48.     Progress per Plan of Care and Progress strengthening /stabilization /functional activity         Timed:         Manual Therapy:         mins  77171;     Therapeutic Exercise:    15     mins  00028;     Neuromuscular Max:    15    mins  78410;    Therapeutic Activity:          mins  69880;     Gait Training:           mins  09628;     Ultrasound:     10     mins  43592;    Ionto                                   mins   58933  Self Care                    _____  mins   22741  Canalith Repos                   mins  67381    Un-Timed:  Electrical Stimulation:         mins  41063 ( );  Dry Needling          mins self-pay   Traction          mins 62540    Timed Treatment:   40   mins   Total Treatment:     40   mins    Jaya Michel PTA  [place] License [id number]  Physical Therapist Assistant

## 2024-08-15 ENCOUNTER — TREATMENT (OUTPATIENT)
Dept: PHYSICAL THERAPY | Facility: CLINIC | Age: 69
End: 2024-08-15
Payer: MEDICARE

## 2024-08-15 DIAGNOSIS — G89.29 CHRONIC PAIN OF RIGHT KNEE: ICD-10-CM

## 2024-08-15 DIAGNOSIS — M25.561 CHRONIC PAIN OF RIGHT KNEE: ICD-10-CM

## 2024-08-15 DIAGNOSIS — Z96.641 STATUS POST TOTAL HIP REPLACEMENT, RIGHT: Primary | ICD-10-CM

## 2024-08-15 DIAGNOSIS — R26.2 DIFFICULTY WALKING: ICD-10-CM

## 2024-08-15 NOTE — PROGRESS NOTES
Physical Therapy Daily Treatment Note  [address], [place]  [address]    Patient: Ariana Michel   : 1955  Diagnosis/ICD-10 Code:  Status post total hip replacement, right [Z96.641]   Problems Addressed this Visit    None  Visit Diagnoses       Status post total hip replacement, right    -  Primary    Chronic pain of right knee        Difficulty walking              Diagnoses         Codes Comments    Status post total hip replacement, right    -  Primary ICD-10-CM: Z96.641  ICD-9-CM: V43.64     Chronic pain of right knee     ICD-10-CM: M25.561, G89.29  ICD-9-CM: 719.46, 338.29     Difficulty walking     ICD-10-CM: R26.2  ICD-9-CM: 719.7           Referring practitioner: Alexis Hernandez*  Date of Initial Visit: Type: THERAPY  Noted: 2024  Today's Date: 8/15/2024    VISIT#: 4    Subjective   Ariana reports her R knee cont to be painful, though she's able to walk with cane now.     Objective     See Exercise, Manual, and Modality Logs for complete treatment.     SCT: Advised pt of risks/benefits of kinesiotaping. Pt was advised to roll tape off skin to remove it after 2-5 days. They were instructed to seek medical care if s/s of severe skin irritation occur. I explicitly warned pt against gluing tape back down to skin if it begins to roll up or come off. Pt verbally consented to taping.      Assessment/Plan  Ariana was advised to use walker for long distances & at night or when she's tired. Trial ktex this date  Progress strengthening /stabilization /functional activity         Timed:         Manual Therapy:    4     mins  84213; TAPING    Therapeutic Exercise:    15     mins  55150;     Neuromuscular Max:        mins  56422;    Therapeutic Activity:          mins  68752;     Gait Training:      10     mins  63097;     Ultrasound:       10   mins  16807;    Ionto                                   mins   37550  Self Care                       5     mins   94955  Tests & Measures               mins   47176  Nigerian stim                    mins   70013    Un-Timed:  Canalith Repos                   mins  82431  Electrical Stimulation:         mins  32153 ( );  Dry Needling          mins 75788/93857  Traction          mins 14865  Low Eval          Mins  20386  Mod Eval          Mins  01696  High Eval                            Mins  32623  Re-Eval                               mins  97230    Timed Treatment:   44   mins   Total Treatment:     44   mins    Kristi Bucio, PT, DPT, cert. DN  Physical Therapist  [place] Lic # [id number]

## 2024-08-19 ENCOUNTER — TREATMENT (OUTPATIENT)
Dept: PHYSICAL THERAPY | Facility: CLINIC | Age: 69
End: 2024-08-19
Payer: MEDICARE

## 2024-08-19 DIAGNOSIS — G89.29 CHRONIC PAIN OF RIGHT KNEE: ICD-10-CM

## 2024-08-19 DIAGNOSIS — M25.561 CHRONIC PAIN OF RIGHT KNEE: ICD-10-CM

## 2024-08-19 DIAGNOSIS — R26.2 DIFFICULTY WALKING: ICD-10-CM

## 2024-08-19 DIAGNOSIS — Z96.641 STATUS POST TOTAL HIP REPLACEMENT, RIGHT: Primary | ICD-10-CM

## 2024-08-19 NOTE — PROGRESS NOTES
Physical Therapy Daily Treatment Note      Patient: Ariana Michel   : 1955  Diagnosis/ICD-10 Code:  Status post total hip replacement, right [Z96.641]   Problems Addressed this Visit    None  Visit Diagnoses       Status post total hip replacement, right    -  Primary    Chronic pain of right knee        Difficulty walking              Diagnoses         Codes Comments    Status post total hip replacement, right    -  Primary ICD-10-CM: Z96.641  ICD-9-CM: V43.64     Chronic pain of right knee     ICD-10-CM: M25.561, G89.29  ICD-9-CM: 719.46, 338.29     Difficulty walking     ICD-10-CM: R26.2  ICD-9-CM: 719.7            Referring practitioner: Alexis Hernandez*  Date of Initial Visit: Type: THERAPY  Noted: 2024  Today's Date: 2024    VISIT#: 5    Subjective Patient reports feeling mild but manageable muscle soreness in R buttock area since progressing to cane only ambulation. Patient reports R knee is feeling better and still has on KT tape.       Objective     See Exercise, Manual, and Modality Logs for complete treatment.     Assessment/Plan Patient will continue to benefit from improved R LE strengthening for improved stability/tolerance with daily functional activity.       Progress per Plan of Care and Progress strengthening /stabilization /functional activity         Timed:         Manual Therapy:         mins  94362;     Therapeutic Exercise:    15     mins  98582;     Neuromuscular Max:        mins  97784;    Therapeutic Activity:     15     mins  71590;     Gait Training:      10     mins  67318;     Ultrasound:          mins  16802;    Ionto                                   mins   68635  Self Care                    _____  mins   18217  Canalith Repos                   mins  41058    Un-Timed:  Electrical Stimulation:         mins  16627 ( );  Dry Needling          mins self-pay   Traction          mins 46848    Timed Treatment:   40   mins   Total Treatment:     40    pierre Michel, [facility]  [place] License [id number]  Physical Therapist Assistant

## 2024-08-22 ENCOUNTER — TREATMENT (OUTPATIENT)
Dept: PHYSICAL THERAPY | Facility: CLINIC | Age: 69
End: 2024-08-22
Payer: MEDICARE

## 2024-08-22 DIAGNOSIS — M25.561 CHRONIC PAIN OF RIGHT KNEE: ICD-10-CM

## 2024-08-22 DIAGNOSIS — R26.2 DIFFICULTY WALKING: ICD-10-CM

## 2024-08-22 DIAGNOSIS — Z96.641 STATUS POST TOTAL HIP REPLACEMENT, RIGHT: Primary | ICD-10-CM

## 2024-08-22 DIAGNOSIS — G89.29 CHRONIC PAIN OF RIGHT KNEE: ICD-10-CM

## 2024-08-22 NOTE — PROGRESS NOTES
Physical Therapy Daily Treatment Note  [address], [address], [place]  [address]    Patient: Ariana Michel   : 1955  Diagnosis/ICD-10 Code:  Status post total hip replacement, right [Z96.641]   Problems Addressed this Visit    None  Visit Diagnoses       Status post total hip replacement, right    -  Primary    Chronic pain of right knee        Difficulty walking              Diagnoses         Codes Comments    Status post total hip replacement, right    -  Primary ICD-10-CM: Z96.641  ICD-9-CM: V43.64     Chronic pain of right knee     ICD-10-CM: M25.561, G89.29  ICD-9-CM: 719.46, 338.29     Difficulty walking     ICD-10-CM: R26.2  ICD-9-CM: 719.7           Referring practitioner: Alexis Hernandez*  Date of Initial Visit: Type: THERAPY  Noted: 2024  Today's Date: 2024    VISIT#: 6    Subjective   Ariana reports she has been walking around her house without her cane occasionally. Some days are better than others. She doesn't feel like she needs tape on R knee anymore, it's just the R upper thigh area that's sore.     Objective     See Exercise, Manual, and Modality Logs for complete treatment.     Assessment/Plan  Ariana tolerated progressed dynamic challenges well today. She was encouraged to use cane in house rather than furniture surf. Will cont to promote flexibility & strength of Les.   Progress strengthening /stabilization /functional activity         Timed:         Manual Therapy:         mins  36530;     Therapeutic Exercise:    10     mins  96088;     Neuromuscular Max:        mins  99269;    Therapeutic Activity:     15     mins  31836;     Gait Training:      10     mins  02871;     Ultrasound:          mins  59873;    Ionto                                   mins   65768  Self Care                            mins   25194  Tests & Measures              mins   87221  [place] stim                    mins   76337    Un-Timed:  Canalith Repos                   mins  80714  Electrical  Stimulation:         mins  59361 ( );  Dry Needling          mins 29182/36507  Traction          mins 69452  Low Eval          Mins  05613  Mod Eval          Mins  25118  High Eval                            Mins  51045  Re-Eval                               mins  06881    Timed Treatment:   35   mins   Total Treatment:     35   mins    Kristi Bucio, PT, DPT, cert. DN  Physical Therapist  [place] Lic # [id number]

## 2024-08-26 ENCOUNTER — TELEPHONE (OUTPATIENT)
Dept: PHYSICAL THERAPY | Facility: CLINIC | Age: 69
End: 2024-08-26

## 2024-08-29 ENCOUNTER — TREATMENT (OUTPATIENT)
Dept: PHYSICAL THERAPY | Facility: CLINIC | Age: 69
End: 2024-08-29
Payer: MEDICARE

## 2024-08-29 DIAGNOSIS — M25.561 CHRONIC PAIN OF RIGHT KNEE: ICD-10-CM

## 2024-08-29 DIAGNOSIS — Z96.641 STATUS POST TOTAL HIP REPLACEMENT, RIGHT: Primary | ICD-10-CM

## 2024-08-29 DIAGNOSIS — R26.2 DIFFICULTY WALKING: ICD-10-CM

## 2024-08-29 DIAGNOSIS — G89.29 CHRONIC PAIN OF RIGHT KNEE: ICD-10-CM

## 2024-08-29 NOTE — PROGRESS NOTES
Physical Therapy Daily Treatment Note      Patient: Ariana Michel   : 1955  Diagnosis/ICD-10 Code:  Status post total hip replacement, right [Z96.641]   Problems Addressed this Visit    None  Visit Diagnoses       Status post total hip replacement, right    -  Primary    Chronic pain of right knee        Difficulty walking              Diagnoses         Codes Comments    Status post total hip replacement, right    -  Primary ICD-10-CM: Z96.641  ICD-9-CM: V43.64     Chronic pain of right knee     ICD-10-CM: M25.561, G89.29  ICD-9-CM: 719.46, 338.29     Difficulty walking     ICD-10-CM: R26.2  ICD-9-CM: 719.7            Referring practitioner: Alexis Hernandez*  Date of Initial Visit: Type: THERAPY  Noted: 2024  Today's Date: 2024    VISIT#: 7    Subjective Patient reports R hip is feeling good with only occasional aching, R knee has been more painful, especially with weightbearing activity. Patient reports she is scheduled to have cortisone shot in R knee next week.       Objective     See Exercise, Manual, and Modality Logs for complete treatment.     Assessment/Plan Patient tolerated all modified treatment well with no reports of increased symptoms. Patient demonstrating improved strength in R hip and is progressing well towards goals with R knee remaining limiting factor with some goals.   Goals  Plan Goals: STG to be met in 6 wks  1. Pt will be safe, independent, and compliant with HEP to optimize recovery and self management of symptoms. MET  2. Pt will demonstrate at least 90 deg R hip flexion actively for sitting upright in chairs.  MET  3. Pt will demonstrate at least 1 STS w/o hands for rising from toilets & restaurant chairs. MET     LTG to be met in 12 wks  1. Pt will demonstrate at least 8 STS without UE for rising from toilets & restaurant chairs. Progressing limited by R knee pain   2. Pt will improve lower ab endurance from 0 to at least 10s for sitting up in bed.  progressing  3.  Pt will demonstrate R hip extension to 0 for lying supine in bed without pain. MET  4. Pt will demonstrate at least 30 deg R & L hip ER for getting in/out of car.  MET  6. Pt will improve [place] hip from 18/48 to at least 35/48.  Assess next visit     Progress per Plan of Care and Progress strengthening /stabilization /functional activity         Timed:         Manual Therapy:         mins  14452;     Therapeutic Exercise:    15     mins  98684;     Neuromuscular Max:        mins  97968;    Therapeutic Activity:     15     mins  03168;     Gait Training:           mins  66441;     Ultrasound:          mins  82273;    Ionto                                   mins   66077  Self Care                    _____  mins   07431  Canalith Repos                   mins  32487    Un-Timed:  Electrical Stimulation:         mins  53237 ( );  Dry Needling          mins self-pay   Traction          mins 38819    Timed Treatment:   30   mins   Total Treatment:     30   mins    Jaya Michel PTA  [place] License [id number]  Physical Therapist Assistant

## 2025-01-07 ENCOUNTER — DOCUMENTATION (OUTPATIENT)
Dept: PHYSICAL THERAPY | Facility: CLINIC | Age: 70
End: 2025-01-07
Payer: COMMERCIAL

## 2025-01-07 DIAGNOSIS — Z96.641 HISTORY OF RIGHT HIP REPLACEMENT: Primary | ICD-10-CM

## 2025-01-07 NOTE — PROGRESS NOTES
Discharge Summary  Discharge Summary from Physical Therapy Report                               [address], [place]  [address]    Dates  PT visit: 8/6/2024-8/29/2024       Number of Visits: 7         Goals: Partially Met    STG to be met in 6 wks  1. Pt will be safe, independent, and compliant with HEP to optimize recovery and self management of symptoms. MET  2. Pt will demonstrate at least 90 deg R hip flexion actively for sitting upright in chairs.  MET  3. Pt will demonstrate at least 1 STS w/o hands for rising from toilets & restaurant chairs. MET     LTG to be met in 12 wks  1. Pt will demonstrate at least 8 STS without UE for rising from toilets & restaurant chairs. Progressing limited by R knee pain   2. Pt will improve lower ab endurance from 0 to at least 10s for sitting up in bed.  progressing  3. Pt will demonstrate R hip extension to 0 for lying supine in bed without pain. MET  4. Pt will demonstrate at least 30 deg R & L hip ER for getting in/out of car.  MET  6. Pt will improve [place] hip from 18/48 to at least 35/48.  Assess next visit   Discharge Plan: Continue with current home exercise program as instructed    Comments Pt had cortisone inj to R knee and canceled appts    Date of Discharge 1/7/25        Kristi Bucio, PT, DPT, cert. DN  Physical Therapist  [place] Lic# [id number]

## 2025-02-03 NOTE — PROGRESS NOTES
Subjective: Optical migraine vertigo    Patient ID: Ariana Michel is a 69 y.o. female.    CHIEF COMPLAINT:  Optical migraines and vertigo    History of Present Illness Ms. Michel is a 69-year-old  female who was referred by MARV Mathis for an evaluation of visual migraines.  The patient states that she has had visual migraines for 20 years.  She reports they have been extremely rare until October of 2024.  She reports having a visual or optical migraine that started on 1024 that lasted 15 minutes and consisted of: Watery eyes, floaters and jagged lines in her vision.  She had a second occurrence on November 2024 which again lasted 15 minutes which showed jagged lines all around her vision.  She had a solitary fleeting occurrence on 11/24/2024 of double vision.  Since that time she has had other visual migraines to a lesser extent.  She has no pain with any of these episodes.    She has seen ophthalmology and had a full workup which was negative.  She was seen at [facility] by [facility] and [place], [place].  She also has had an MRI of the brain after all of the episodes completed on 11/26/2024 which was within normal limits with small chronic white matter disease.      Visual migraines  Complaint: Visual migraines in October she had one and this lasted longer -   Onset: 20 years ago  Aura: Only Aura no  Location: Both eyes  Quality: Floaters, jagged lines,denied sparkles   Severity: upsetting  Duration: Max 15 min  Frequency: Sporadic  Timing: while awake  Worsening factors:unknown  Alleviating factors:dark room   Associated Signs and symptoms: nothing  Current meds: none  Past medications: none  Family History: none      Patient's vertigo description:  Circular Spinning sensation   Onset: 1 year  Frequency: several times a week  Duration: 1 hour  Meds: None      Orthostatic testing Negative:  Blood pressure      Body position  1.139/83 65    Lying  2.139/84 69      Sitting  3.139/83 73      Standing          MRI of the brain completed at [facility]  11/26/2024 with and without contrast  Impression:  1.  No definite findings of history of acute intracranial abnormality at this time.  2.  Findings in the cerebral white matter which are nonspecific but most commonly related to mild chronic small vessel ischemic changes  Signed by Jamie Lock   11 /26/2024 11/20/2024        The following portions of the patient's history were reviewed and updated as appropriate: allergies, current medications, past family history, past medical history, past social history, past surgical history and problem list.      Family History   Problem Relation Age of Onset    Leukemia Mother     Stroke Maternal Grandfather        Past Medical History:   Diagnosis Date    Anxiety     Bilateral leg edema     COVID-19 11/2020    Crohn's disease     not active    Hypertension     Osteoarthritis     PONV (postoperative nausea and vomiting)     RLS (restless legs syndrome)        Social History     Socioeconomic History    Marital status:    Tobacco Use    Smoking status: Never   Vaping Use    Vaping status: Never Used   Substance and Sexual Activity    Alcohol use: Not Currently    Drug use: Not Currently    Sexual activity: Defer         Current Outpatient Medications:     escitalopram (LEXAPRO) 10 MG tablet, Take 1 tablet by mouth Daily. (Patient taking differently: Take 2 tablets by mouth Daily.), Disp: , Rfl:     furosemide (LASIX) 20 MG tablet, Take 1 tablet by mouth Daily., Disp: , Rfl:     losartan-hydrochlorothiazide (HYZAAR) 50-12.5 MG per tablet, TAKE 1 TABLET BY MOUTH DAILY TO. REPLACE IRBESARTAN, Disp: , Rfl:     meloxicam (MOBIC) 15 MG tablet, Take 1 tablet by mouth Daily., Disp: , Rfl:     montelukast (SINGULAIR) 10 MG tablet, Take 1 tablet by mouth Every Night., Disp: , Rfl:     potassium chloride (K-DUR,KLOR-CON) 10 MEQ CR tablet, Take 1 tablet by mouth Daily., Disp: , Rfl:     pramipexole (MIRAPEX)  0.25 MG tablet, Take 1 tablet by mouth 3 (Three) Times a Day., Disp: , Rfl:     vitamin D (ERGOCALCIFEROL) 1.25 MG (07212 UT) capsule capsule, Take 1 capsule by mouth 1 (One) Time Per Week., Disp: , Rfl:     acetaminophen (TYLENOL) 650 MG 8 hr tablet, Take 1 tablet by mouth Every 8 (Eight) Hours As Needed for Mild Pain. (Patient not taking: Reported on 2/4/2025), Disp: , Rfl:     ibuprofen (ADVIL,MOTRIN) 800 MG tablet, Take 1 tablet by mouth Every 6 (Six) Hours As Needed for Mild Pain. (Patient not taking: Reported on 2/4/2025), Disp: , Rfl:     irbesartan (AVAPRO) 75 MG tablet, Take 1 tablet by mouth Daily. (Patient not taking: Reported on 2/4/2025), Disp: , Rfl:     magnesium oxide (MAG-OX) 400 MG tablet, Take 1 tablet by mouth Daily. (Patient not taking: Reported on 2/4/2025), Disp: , Rfl:     multivitamin with minerals tablet tablet, Take 1 tablet by mouth Daily. (Patient not taking: Reported on 2/4/2025), Disp: , Rfl:     nystatin (MYCOSTATIN) 059534 UNIT/GM cream, Apply 1 application  topically to the appropriate area as directed As Needed. (Patient not taking: Reported on 2/4/2025), Disp: , Rfl:     ondansetron (Zofran) 4 MG tablet, Take 1 tablet by mouth Every 6 (Six) Hours As Needed for Nausea or Vomiting., Disp: 30 tablet, Rfl: 0    oxyCODONE-acetaminophen (PERCOCET) 5-325 MG per tablet, Take 1 tablet by mouth Every 4 (Four) Hours As Needed for Severe Pain., Disp: 50 tablet, Rfl: 0    Probiotic Product (Risaquad-2) capsule capsule, Take 1 capsule by mouth Daily. (Patient not taking: Reported on 2/4/2025), Disp: , Rfl:     psyllium (METAMUCIL) 58.6 % packet, Take 1 packet by mouth Daily. (Patient not taking: Reported on 2/4/2025), Disp: , Rfl:     topiramate (Topamax) 25 MG tablet, Wk1: 1 tab qd, Wk2: 1 tab bid, Disp: 60 tablet, Rfl: 5    Review of Systems     I have reviewed ROS completed by medical assistant.     Objective:    Neurological Exam  Mental Status  Awake and alert. Oriented only to person,  place, time and situation. Speech is normal. Language is fluent with no aphasia. Attention and concentration are normal. Fund of knowledge is appropriate for level of education.    Cranial Nerves  CN II: Right visual acuity: Normal. Left visual acuity: Normal. Right normal visual field. Left normal visual field.  CN III, IV, VI: Extraocular movements intact bilaterally. No nystagmus. Normal saccades. Normal smooth pursuit.   Right pupil: 2 mm. Reactive to light. Reactive to accommodation.   Left pupil: 2 mm. Reactive to light. Reactive to accommodation.  CN V:  Right: Facial sensation is normal. Jaw strength is normal.  Left: Facial sensation is normal on the left. Jaw strength is normal.  CN VII:  Right: There is no facial weakness.  Left: There is no facial weakness.  CN IX, X: Palate elevates symmetrically  CN XI: Shoulder shrug strength is normal.  CN XII: Tongue midline without atrophy or fasciculations.    Motor  Normal muscle bulk throughout. No fasciculations present.                                               Right                     Left   Biceps                                   5                          5  Brachioradialis                      5                          5   Triceps                                  5                          5   Pronator                                5                          5   Supinator                              5                           5   Wrist flexor                            5                          5   Wrist extensor                       5                          5   Finger flexor                          5                          5   Finger extensor                     5                          5   Interossei                              5                          5   Abductor pollicis brevis         5                          5   Flexor pollicis brevis             5                          5   Quadriceps                           5                           5   Hamstring                             5                          5   Gastrocnemius                     5                           5   Anterior tibialis                      5                          5   Posterior tibialis                    5                          5   Peroneal                               5                          5  Ankle dorsiflexor                   5                          5  Ankle plantar flexor              5                           5    Sensory  Light touch is normal in upper and lower extremities. Pinprick is normal in upper and lower extremities. Temperature is normal in upper and lower extremities. Vibration is normal in upper and lower extremities.     Reflexes                                            Right                      Left  Brachioradialis                    2+                         2+  Biceps                                 2+                         2+  Triceps                                2+                         2+  Finger flex                           2+                         2+  Hamstring                            2+                         2+  Patellar                                2+                         2+  Achilles                                2+                         2+  Right Plantar: downgoing  Left Plantar: downgoing    Right pathological reflexes: Ankle clonus absent.  Left pathological reflexes: Ankle clonus absent.    Coordination  Right: Finger-to-nose normal. Rapid alternating movement normal. Heel-to-shin normal.Left: Finger-to-nose normal. Rapid alternating movement normal. Heel-to-shin normal.    Gait  Casual gait is normal including stance, stride, and arm swing.Normal toe walking. Normal heel walking. Normal tandem gait. Romberg is absent.     Assessment/Plan:  The patient was in agreement with a trial with Topamax, starting at 25 mg at bedtime for 1 week then twice a day for the second week.  She is to  notify the clinic if she has any more of these visual migraines.  For vertigo the patient will be referred to physical therapy for vestibular rehab.  Should the patient's visual episodes continue, may order an MRA of brain.  Topamax could be titrated up further.  She will be scheduled back for a follow-up visit and we will reevaluate at that time.    Diagnoses and all orders for this visit:    1. Vertigo (Primary)  -     Ambulatory Referral to Physical Therapy for Evaluation & Treatment    2. Visual aura  -     topiramate (Topamax) 25 MG tablet; Wk1: 1 tab qd, Wk2: 1 tab bid  Dispense: 60 tablet; Refill: 5        Return in about 6 months (around 8/4/2025) for Next scheduled follow up Kitty Hartman.    I spent 54 minutes caring for Ariana on this date of service. This time includes time spent by me in the following activities: preparing for the visit, obtaining and/or reviewing a separately obtained history, performing a medically appropriate examination and/or evaluation, counseling and educating the patient/family/caregiver, ordering medications, tests, or procedures, referring and communicating with other health care professionals, documenting information in the medical record, and independently interpreting results and communicating that information with the patient/family/caregiver.      This document has been electronically signed by Kitty FAIR on February 4, 2025 10:37 EST

## 2025-02-04 ENCOUNTER — OFFICE VISIT (OUTPATIENT)
Dept: NEUROLOGY | Facility: CLINIC | Age: 70
End: 2025-02-04
Payer: MEDICARE

## 2025-02-04 VITALS — BODY MASS INDEX: 40.15 KG/M2 | HEIGHT: 65 IN | WEIGHT: 241 LBS

## 2025-02-04 DIAGNOSIS — R42 VERTIGO: Primary | ICD-10-CM

## 2025-02-04 DIAGNOSIS — H53.9 VISUAL AURA: ICD-10-CM

## 2025-02-04 PROCEDURE — 1160F RVW MEDS BY RX/DR IN RCRD: CPT | Performed by: NURSE PRACTITIONER

## 2025-02-04 PROCEDURE — 1159F MED LIST DOCD IN RCRD: CPT | Performed by: NURSE PRACTITIONER

## 2025-02-04 PROCEDURE — 99204 OFFICE O/P NEW MOD 45 MIN: CPT | Performed by: NURSE PRACTITIONER

## 2025-02-04 RX ORDER — ERGOCALCIFEROL 1.25 MG/1
1 CAPSULE, LIQUID FILLED ORAL WEEKLY
COMMUNITY
Start: 2025-01-16

## 2025-02-04 RX ORDER — MELOXICAM 15 MG/1
15 TABLET ORAL DAILY
COMMUNITY

## 2025-02-04 RX ORDER — TOPIRAMATE 25 MG/1
TABLET, FILM COATED ORAL
Qty: 60 TABLET | Refills: 5 | Status: SHIPPED | OUTPATIENT
Start: 2025-02-04

## 2025-02-04 RX ORDER — LOSARTAN POTASSIUM AND HYDROCHLOROTHIAZIDE 12.5; 5 MG/1; MG/1
TABLET ORAL
COMMUNITY

## 2025-02-05 ENCOUNTER — PATIENT ROUNDING (BHMG ONLY) (OUTPATIENT)
Dept: NEUROLOGY | Facility: CLINIC | Age: 70
End: 2025-02-05
Payer: MEDICARE

## 2025-02-11 NOTE — PROGRESS NOTES
Physical Therapy Initial Evaluation and Plan of Care                           [address] [place] [address] [address], [address], [place]  [address]    Patient: Ariana Michel   : 1955  Diagnosis/ICD-10 Code:  Dizziness [R42]  Referring practitioner: Kitty Hartman, *  Date of Initial Visit: 2025  Today's Date: 2025  Patient seen for 1 sessions           Subjective Questionnaire: DHI: 20% impairment      Subjective Evaluation    History of Present Illness  Mechanism of injury: Pt with 20 yr hx of recurrent visual migraines. She was prescribed Topomax. She also notes bout of dizziness.   Ariana reports she's never really noticed vertigo before but when she went to [facility] and had positional testing done and had sxs. So she went to [facility] and was prescribed topomax which she's taken for the past week.              Objective       Cervical AROM is WNL without symptom aggravation.  Cervical instability negative.  Negative central vestibular signs (smooth pursuit, saccades, resting nystagmus).  Positive peripheral vestibular signs (VOR, head thrust).  Modified CTSIB = 3/4 (30,30,30,17) indicating decreased sensory integration in balance with vestibular weakness and visual dependence.     BPPV testing: [place] Halpike  R +  Performed CRT Epley R,    Access Code: [email or web address]  URL: [email or web address]  Date: 2025  Prepared by: Kristi Bucio    Exercises  - Self-Epley Maneuver Right Ear  - 3 x weekly - 3 sets  - Seated Right Head Turns Vestibular Habituation  - 1 x daily - 7 x weekly - 1 sets - 10 reps  - Narrow Stance with Eyes Closed and Head Rotation on Foam Pad  - 1 x daily - 7 x weekly - 3 sets - 10 reps    Assessment & Plan       Assessment  Impairments: activity intolerance, impaired balance, lacks appropriate home exercise program, safety issue and weight-bearing intolerance   Other impairment: Dizziness  Functional limitations: walking, stooping and unable to perform repetitive tasks    Assessment details: Pt presents to PT with symptoms consistent with vertigo because of vestibular weakness. She reported mild/moderate dizziness w/R Gabriel hallpike which resolved completely in 3 bouts of Epley.     The patient is an appropriate candidate for physical therapy and will benefit from skilled patient intervention in order address the above impairments/limitations.  The plan of care, goals and treatment plan were discussed with the patient and the patient is agreeable to participating in patient services.   Prognosis: good    Goals  Plan Goals: SHORT TERM GOALS: Time for Goal Achievement Options: 4 weeks    1.  Patient to be compliant with HEP and tolerate only minimal side effects.  2.  Pt able to demonstrate the ability to sit to stand without dizziness or light headedness to improve safety with transfers.  3.  Pt able to demonstrate eyes closed on foam pad x 30s for showering safely.    LONG TERM GOALS: Time for Goal Achievement Options: 12weeks  1.  Pt to report minimal to no vertigo symptoms with all activities.  2.  Pt able to ambulate across clinic with head rotation and no dizziness, nausea or other side effects for improving ambulation in community and house.  3.  Patient to score <12 on Dizziness Handicap Inventory demonstrating a decreased perception of handicap.        Plan  Therapy options: will be seen for skilled therapy services  Planned modality interventions: cryotherapy, high voltage pulsed current (pain management), TENS, thermotherapy (hydrocollator packs), traction and ultrasound  Planned therapy interventions: abdominal trunk stabilization, neuromuscular re-education, postural training, strengthening, therapeutic activities, home exercise program, gait training, fine motor coordination training, balance/weight-bearing training, compression, ADL retraining, flexibility, functional ROM exercises, IADL retraining, joint mobilization, manual therapy, motor coordination training,  stretching, soft tissue mobilization and transfer training  Frequency: 2x week (1-2x week)  Duration in weeks: 12  Treatment plan discussed with: patient  Plan details: BPPV symptoms will be treated first and progress on to vestibular weakness symptoms.          Timed:         Manual Therapy:         mins  80424;     Therapeutic Exercise:         mins  33019;     Neuromuscular Max:    15    mins  24600;    Therapeutic Activity:     10     mins  62946;     Gait Training:           mins  51598;     Ultrasound:          mins  83946;    Self-care  __15__ mins 24948  Tests & Measures              mins  81853      Un-Timed:  Electrical Stimulation:         mins  37111 ( );  Dry Needling          mins self-pay 20561  Traction          mins 33960  Low Eval          mins  18727  Mod Eval     15     mins  52166  High Eval                            mins  39131  Canal repositioning ___  mins  86068        Timed Treatment:   40   mins   Total Treatment:     55   mins    PT SIGNATURE: Kristi Bucio PT, DPT, cert. DN  [place] license # [id number]  Electronically signed by Kristi Bucio PT, 02/11/25, 1:44 PM EST    Initial Certification  Certification Period: 2/12/2025 through 5/12/2025  I certify that the therapy services are furnished while this patient is under my care.  The services outlined above are required by this patient, and will be reviewed every 90 days.     PHYSICIAN: Kitty Hartman APRN    NPI: [id number]                                       DATE: ______________________________________________________________________________________________     Please sign and return via fax to [phone]. Thank you, [facility].

## 2025-02-12 ENCOUNTER — TREATMENT (OUTPATIENT)
Dept: PHYSICAL THERAPY | Facility: CLINIC | Age: 70
End: 2025-02-12
Payer: MEDICARE

## 2025-02-12 DIAGNOSIS — H83.2X3 VESTIBULAR HYPOFUNCTION OF BOTH EARS: ICD-10-CM

## 2025-02-12 DIAGNOSIS — R42 DIZZINESS: Primary | ICD-10-CM

## 2025-02-12 DIAGNOSIS — H81.11 BPPV (BENIGN PAROXYSMAL POSITIONAL VERTIGO), RIGHT: ICD-10-CM

## 2025-02-12 DIAGNOSIS — R26.89 UNSTEADY WHEN TURNING: ICD-10-CM

## 2025-02-20 ENCOUNTER — TELEPHONE (OUTPATIENT)
Dept: PHYSICAL THERAPY | Facility: CLINIC | Age: 70
End: 2025-02-20

## 2025-02-27 ENCOUNTER — TREATMENT (OUTPATIENT)
Dept: PHYSICAL THERAPY | Facility: CLINIC | Age: 70
End: 2025-02-27
Payer: MEDICARE

## 2025-02-27 DIAGNOSIS — R42 DIZZINESS: Primary | ICD-10-CM

## 2025-02-27 DIAGNOSIS — R26.89 UNSTEADY WHEN TURNING: ICD-10-CM

## 2025-02-27 DIAGNOSIS — H81.11 BPPV (BENIGN PAROXYSMAL POSITIONAL VERTIGO), RIGHT: ICD-10-CM

## 2025-02-27 DIAGNOSIS — H83.2X3 VESTIBULAR HYPOFUNCTION OF BOTH EARS: ICD-10-CM

## 2025-02-27 NOTE — PROGRESS NOTES
Physical Therapy Daily Treatment Note  [address], [address], [place]  [address]    Patient: Ariana Michel   : 1955  Diagnosis/ICD-10 Code:  Dizziness [R42]   Problems Addressed this Visit    None  Visit Diagnoses       Dizziness    -  Primary    BPPV (benign paroxysmal positional vertigo), right        Vestibular hypofunction of both ears        Unsteady when turning              Diagnoses         Codes Comments    Dizziness    -  Primary ICD-10-CM: R42  ICD-9-CM: 780.4     BPPV (benign paroxysmal positional vertigo), right     ICD-10-CM: H81.11  ICD-9-CM: 386.11     Vestibular hypofunction of both ears     ICD-10-CM: H83.2X3  ICD-9-CM: 386.50     Unsteady when turning     ICD-10-CM: R26.89  ICD-9-CM: 781.99           Referring practitioner: Kitty Hartman, *  Date of Initial Visit: Type: THERAPY  Noted: 2025  Today's Date: 2025    VISIT#: 2    Subjective   Ariana reports her dizziness/nausea has been completely resolved in recent weeks. She notes she didn't realize how impacted she was by sxs until she went to clean a house yesterday. She is feeling clearer. She has continued to take topomax.     Objective     See Exercise, Manual, and Modality Logs for complete treatment.     Assessment/Plan  Advised Ariana to discuss potential cessation of prescription med with prescribing physician. She was instructed in vestibular habituation challenges & sensory integration progressions for continuation of HEP. She will be DC at this time, though was encouraged to contact PT with any questions or concerns or to get back on schedule within 90 days.   Plan to DC with HEP         Timed:         Manual Therapy:         mins  32217;     Therapeutic Exercise:         mins  50207;     Neuromuscular Max:    15    mins  58893;    Therapeutic Activity:     15     mins  41366;     Gait Training:           mins  92317;     Ultrasound:          mins  15726;    Ionto                                   mins    73875  Self Care                       10     mins   93582  Tests & Measures              mins   37474  Croatian stim                    mins   83465    Un-Timed:  Canalith Repos                   mins  99526  Electrical Stimulation:         mins  16316 ( );  Dry Needling          mins 91761/22622  Traction          mins 98623  Low Eval          Mins  83461  Mod Eval          Mins  41594  High Eval                            Mins  98220  Re-Eval                               mins  18439    Timed Treatment:   40   mins   Total Treatment:     40   mins    Kristi Bucio PT, DPT, cert. DN  Physical Therapist  [place] Lic # [id number]

## 2025-04-10 ENCOUNTER — TELEPHONE (OUTPATIENT)
Dept: NEUROLOGY | Facility: CLINIC | Age: 70
End: 2025-04-10
Payer: MEDICARE

## 2025-04-10 NOTE — TELEPHONE ENCOUNTER
Caller: Ariana Michel    Relationship: Self    Best call back number: [phone]    Which medication are you concerned about: MECLINIZINE?    Who prescribed you this medication: CATALINO COMBS    When did you start taking this medication: HAVEN'T YET    What are your concerns: PATIENT SAID YOU HAD MENTIONED AT HER PREVIOUS VISIT GIVING HER MEDICINE TO HELP WITH HER DIZZINESS AND MOTION SICKNESS AND SHE THINKS IT WAS CALLED MECLIZINE. SHE WOULD LIKE TO GO AHEAD GET THAT SCRIPT. HER OTHER MEDICINES ARE WORKING FINE BUT SHE IS GOING TO BE IN A CAR FOR 30 HOURS AND WOULD LIKE BACKUP.    How long have you had these concerns: LAST COUPLE OF WEEKS

## 2025-04-14 DIAGNOSIS — R42 VERTIGO: Primary | ICD-10-CM

## 2025-04-14 RX ORDER — MECLIZINE HCL 25MG 25 MG/1
25 TABLET, CHEWABLE ORAL 3 TIMES DAILY PRN
Qty: 15 TABLET | Refills: 1 | Status: SHIPPED | OUTPATIENT
Start: 2025-04-14

## 2025-08-06 ENCOUNTER — OFFICE VISIT (OUTPATIENT)
Dept: NEUROLOGY | Facility: CLINIC | Age: 70
End: 2025-08-06
Payer: MEDICARE

## 2025-08-06 VITALS
BODY MASS INDEX: 37.82 KG/M2 | HEART RATE: 82 BPM | HEIGHT: 65 IN | WEIGHT: 227 LBS | DIASTOLIC BLOOD PRESSURE: 72 MMHG | SYSTOLIC BLOOD PRESSURE: 121 MMHG

## 2025-08-06 DIAGNOSIS — H53.9 VISUAL AURA: Primary | ICD-10-CM

## 2025-08-06 PROCEDURE — 1159F MED LIST DOCD IN RCRD: CPT | Performed by: NURSE PRACTITIONER

## 2025-08-06 PROCEDURE — 99212 OFFICE O/P EST SF 10 MIN: CPT | Performed by: NURSE PRACTITIONER

## 2025-08-06 PROCEDURE — 1160F RVW MEDS BY RX/DR IN RCRD: CPT | Performed by: NURSE PRACTITIONER

## 2025-08-06 RX ORDER — IBUPROFEN 800 MG/1
1 TABLET, FILM COATED ORAL 3 TIMES DAILY PRN
COMMUNITY
Start: 2024-06-21

## 2025-08-06 RX ORDER — OXYCODONE AND ACETAMINOPHEN 5; 325 MG/1; MG/1
1-2 TABLET ORAL EVERY 4 HOURS PRN
COMMUNITY

## 2025-08-06 RX ORDER — POTASSIUM CHLORIDE 750 MG/1
CAPSULE, EXTENDED RELEASE ORAL
COMMUNITY

## 2025-08-06 RX ORDER — TOPIRAMATE 25 MG/1
25 TABLET, FILM COATED ORAL 2 TIMES DAILY
Qty: 60 TABLET | Refills: 11 | Status: SHIPPED | OUTPATIENT
Start: 2025-08-06 | End: 2026-08-06

## 2025-08-06 RX ORDER — NYSTATIN 100000 U/G
CREAM TOPICAL SEE ADMIN INSTRUCTIONS
COMMUNITY

## 2025-08-06 RX ORDER — IRBESARTAN 75 MG/1
75 TABLET ORAL DAILY
COMMUNITY

## (undated) DEVICE — GLV SURG SENSICARE PI ORTHO SZ8.5 LF STRL

## (undated) DEVICE — C-ARM: Brand: UNBRANDED

## (undated) DEVICE — PICO 7 10CM X 30CM: Brand: PICO™ 7

## (undated) DEVICE — COVER,MAYO STAND,STERILE: Brand: MEDLINE

## (undated) DEVICE — SUT ETHLN 2/0 PS 18IN 585H

## (undated) DEVICE — UNDERGLV SURG BIOGEL INDICAT PI SZ8.5 BLU

## (undated) DEVICE — SUT ETHIB 2 CV V37 MS/4 30IN MX69G

## (undated) DEVICE — PK TOTL HIP 50

## (undated) DEVICE — SOL ISO/ALC RUB 91PCT 16OZ

## (undated) DEVICE — KT SURG TURNOVER 050

## (undated) DEVICE — GLV SURG NEOLON 2G PF LF 6.5 STRL

## (undated) DEVICE — ZIPPERED TOGA, 2X LARGE: Brand: FLYTE

## (undated) DEVICE — IRRIGATOR BULB ASEPTO 60CC STRL

## (undated) DEVICE — STERILE PATIENT PROTECTIVE PAD FOR IMP® KNEE POSITIONERS & COHESIVE WRAP (10 / CASE): Brand: DE MAYO KNEE POSITIONER®

## (undated) DEVICE — PK TOTL KN 50

## (undated) DEVICE — 6617 IOBAN II PATIENT ISOLATION DRAPE 5/BX,4BX/CS: Brand: STERI-DRAPE™ IOBAN™ 2

## (undated) DEVICE — RECIPROCATING BLADE HEAVY DUTY LONG, OFFSET  (77.6 X 0.77 X 11.2MM)

## (undated) DEVICE — APPL CHLORAPREP HI/LITE 26ML ORNG

## (undated) DEVICE — GAUZE,SPONGE,FLUFF,6"X6.75",STRL,5/TRAY: Brand: MEDLINE

## (undated) DEVICE — KT PT POSITION SUPINE HANA/PROFX TABL

## (undated) DEVICE — DUAL CUT SAGITTAL BLADE

## (undated) DEVICE — UNDRGLV SURG BIOGEL PUNCTUREINDICATION SZ7 PF STRL

## (undated) DEVICE — WRAP KNEE COLD THERAPY

## (undated) DEVICE — NEEDLE, QUINCKE, 20GX3.5": Brand: MEDLINE

## (undated) DEVICE — TBG PENCL TELESCP MEGADYNE SMOKE EVAC 15FT

## (undated) DEVICE — SUT VIC 1 CT1 36IN J947H

## (undated) DEVICE — TOWEL,OR,DSP,ST,WHITE,DLX,4/PK,20PK/CS: Brand: MEDLINE

## (undated) DEVICE — SYR LUERLOK 50ML

## (undated) DEVICE — SUT VIC 2/0 CT1 36IN

## (undated) DEVICE — ZIP 24 SURGICAL SKIN CLOSURE DEVICE, PSA: Brand: ZIP 24 SURGICAL SKIN CLOSURE DEVICE

## (undated) DEVICE — CVR HNDL LT SURG ACCSSRY BLU STRL

## (undated) DEVICE — HANDPIECE SET WITH COAXIAL MULTI-ORIFICE TIP AND SUCTION TUBE: Brand: INTERPULSE

## (undated) DEVICE — CEMENT MIXING SYSTEM WITH FEMORAL BREAKWAY NOZZLE: Brand: REVOLUTION

## (undated) DEVICE — GLV SURG SENSICARE PI ORTHO PF SZ7 LF STRL

## (undated) DEVICE — SOL IRRIG NACL 1000ML

## (undated) DEVICE — 3M™ STERI-DRAPE™ INSTRUMENT POUCH 1018L: Brand: STERI-DRAPE™

## (undated) DEVICE — S/M FLEXIBLE ALEXIS ORTHOPAEDIC PROTECTOR: Brand: ALEXIS® ORTHOPAEDIC PROTECTOR

## (undated) DEVICE — ZIP 16 SURGICAL SKIN CLOSURE DEVICE, PSA: Brand: ZIP 16 SURGICAL SKIN CLOSURE DEVICE

## (undated) DEVICE — 450 ML BOTTLE OF 0.05% CHLORHEXIDINE GLUCONATE IN 99.95% STERILE WATER FOR IRRIGATION, USP AND APPLICATOR.: Brand: IRRISEPT ANTIMICROBIAL WOUND LAVAGE

## (undated) DEVICE — Device

## (undated) DEVICE — PENCL EVAC ULTRAVAC SMOKE W/BLD

## (undated) DEVICE — DISPOSABLE TOURNIQUET CUFF 30"X4", 1-LINE, WHITE, STERILE, 1EA/PK, 10PK/CS: Brand: ASP MEDICAL

## (undated) DEVICE — 1016 S-DRAPE IRRIG POUCH 10/BOX: Brand: STERI-DRAPE™

## (undated) DEVICE — SOL ISO/ALC RUB 70PCT 4OZ

## (undated) DEVICE — PACK,UNIVERSAL,NO GOWNS: Brand: MEDLINE